# Patient Record
Sex: FEMALE | Race: WHITE | Employment: UNEMPLOYED | URBAN - METROPOLITAN AREA
[De-identification: names, ages, dates, MRNs, and addresses within clinical notes are randomized per-mention and may not be internally consistent; named-entity substitution may affect disease eponyms.]

---

## 2020-07-20 ENCOUNTER — HOSPITAL ENCOUNTER (EMERGENCY)
Age: 50
Discharge: HOME | End: 2020-07-20
Attending: EMERGENCY MEDICINE | Admitting: EMERGENCY MEDICINE
Payer: MEDICARE

## 2020-07-20 ENCOUNTER — APPOINTMENT (OUTPATIENT)
Dept: GENERAL RADIOLOGY | Age: 50
End: 2020-07-20
Attending: EMERGENCY MEDICINE
Payer: MEDICARE

## 2020-07-20 VITALS
BODY MASS INDEX: 41.81 KG/M2 | HEART RATE: 99 BPM | TEMPERATURE: 98.8 F | HEIGHT: 66 IN | SYSTOLIC BLOOD PRESSURE: 145 MMHG | OXYGEN SATURATION: 93 % | WEIGHT: 260.14 LBS | RESPIRATION RATE: 20 BRPM | DIASTOLIC BLOOD PRESSURE: 89 MMHG

## 2020-07-20 DIAGNOSIS — R05.9 COUGH: ICD-10-CM

## 2020-07-20 DIAGNOSIS — R06.02 SOB (SHORTNESS OF BREATH): ICD-10-CM

## 2020-07-20 DIAGNOSIS — Z20.822 SUSPECTED COVID-19 VIRUS INFECTION: Primary | ICD-10-CM

## 2020-07-20 LAB
ALBUMIN SERPL-MCNC: 3.9 G/DL (ref 3.5–5)
ALBUMIN/GLOB SERPL: 1 {RATIO} (ref 1.1–2.2)
ALP SERPL-CCNC: 112 U/L (ref 45–117)
ALT SERPL-CCNC: 24 U/L (ref 12–78)
ANION GAP SERPL CALC-SCNC: 6 MMOL/L (ref 5–15)
AST SERPL-CCNC: 16 U/L (ref 15–37)
BASOPHILS # BLD: 0.1 K/UL (ref 0–0.1)
BASOPHILS NFR BLD: 1 % (ref 0–1)
BILIRUB SERPL-MCNC: 0.7 MG/DL (ref 0.2–1)
BUN SERPL-MCNC: 8 MG/DL (ref 6–20)
BUN/CREAT SERPL: 10 (ref 12–20)
CALCIUM SERPL-MCNC: 9.9 MG/DL (ref 8.5–10.1)
CHLORIDE SERPL-SCNC: 97 MMOL/L (ref 97–108)
CO2 SERPL-SCNC: 31 MMOL/L (ref 21–32)
COMMENT, HOLDF: NORMAL
CREAT SERPL-MCNC: 0.8 MG/DL (ref 0.55–1.02)
DIFFERENTIAL METHOD BLD: ABNORMAL
EOSINOPHIL # BLD: 0.3 K/UL (ref 0–0.4)
EOSINOPHIL NFR BLD: 4 % (ref 0–7)
ERYTHROCYTE [DISTWIDTH] IN BLOOD BY AUTOMATED COUNT: 14.2 % (ref 11.5–14.5)
GLOBULIN SER CALC-MCNC: 4 G/DL (ref 2–4)
GLUCOSE SERPL-MCNC: 104 MG/DL (ref 65–100)
HCT VFR BLD AUTO: 52.5 % (ref 35–47)
HGB BLD-MCNC: 16.7 G/DL (ref 11.5–16)
IMM GRANULOCYTES # BLD AUTO: 0.1 K/UL (ref 0–0.04)
IMM GRANULOCYTES NFR BLD AUTO: 1 % (ref 0–0.5)
LYMPHOCYTES # BLD: 2.5 K/UL (ref 0.8–3.5)
LYMPHOCYTES NFR BLD: 29 % (ref 12–49)
MCH RBC QN AUTO: 29.8 PG (ref 26–34)
MCHC RBC AUTO-ENTMCNC: 31.8 G/DL (ref 30–36.5)
MCV RBC AUTO: 93.6 FL (ref 80–99)
MONOCYTES # BLD: 0.6 K/UL (ref 0–1)
MONOCYTES NFR BLD: 6 % (ref 5–13)
NEUTS SEG # BLD: 5.2 K/UL (ref 1.8–8)
NEUTS SEG NFR BLD: 59 % (ref 32–75)
NRBC # BLD: 0 K/UL (ref 0–0.01)
NRBC BLD-RTO: 0 PER 100 WBC
PLATELET # BLD AUTO: 241 K/UL (ref 150–400)
PMV BLD AUTO: 9.5 FL (ref 8.9–12.9)
POTASSIUM SERPL-SCNC: 3 MMOL/L (ref 3.5–5.1)
PROT SERPL-MCNC: 7.9 G/DL (ref 6.4–8.2)
RBC # BLD AUTO: 5.61 M/UL (ref 3.8–5.2)
SAMPLES BEING HELD,HOLD: NORMAL
SODIUM SERPL-SCNC: 134 MMOL/L (ref 136–145)
WBC # BLD AUTO: 8.8 K/UL (ref 3.6–11)

## 2020-07-20 PROCEDURE — 99283 EMERGENCY DEPT VISIT LOW MDM: CPT

## 2020-07-20 PROCEDURE — 80053 COMPREHEN METABOLIC PANEL: CPT

## 2020-07-20 PROCEDURE — 36415 COLL VENOUS BLD VENIPUNCTURE: CPT

## 2020-07-20 PROCEDURE — 87635 SARS-COV-2 COVID-19 AMP PRB: CPT

## 2020-07-20 PROCEDURE — 85025 COMPLETE CBC W/AUTO DIFF WBC: CPT

## 2020-07-20 PROCEDURE — 71046 X-RAY EXAM CHEST 2 VIEWS: CPT

## 2020-07-20 RX ORDER — ONDANSETRON 4 MG/1
4 TABLET, FILM COATED ORAL
Qty: 15 TAB | Refills: 0 | Status: ON HOLD | OUTPATIENT
Start: 2020-07-20 | End: 2020-12-22

## 2020-07-20 RX ORDER — ALBUTEROL SULFATE 90 UG/1
2 AEROSOL, METERED RESPIRATORY (INHALATION)
Qty: 1 INHALER | Refills: 0 | Status: SHIPPED | OUTPATIENT
Start: 2020-07-20

## 2020-07-20 RX ORDER — ASCORBIC ACID 500 MG
500 TABLET ORAL 2 TIMES DAILY
Qty: 20 TAB | Refills: 0 | Status: ON HOLD | OUTPATIENT
Start: 2020-07-20 | End: 2021-03-16

## 2020-07-20 RX ORDER — UREA 10 %
220 LOTION (ML) TOPICAL DAILY
Qty: 10 TAB | Refills: 0 | Status: ON HOLD | OUTPATIENT
Start: 2020-07-20 | End: 2021-03-16

## 2020-07-20 NOTE — ED PROVIDER NOTES
HPI     Patient presents the emergency department today with complaint of mild shortness of breath,headache, sore throat, cough, nausea and diarrhea. She also states that she has been feeling body aches, joint pain. She would like a COVID test, because her family believes that she has been exposed, and she recently had contact with several members of her family at a reunion. Patient denies difficulty breathing, she denies chest pain, abdominal pain.   She denies dyspnea on exertion  Past Medical History:   Diagnosis Date    Alcoholic ([facility] [address]     Psychiatric disorder     Bipolar       Past Surgical History:   Procedure Laterality Date    HX GYN      D&C         Family History:   Problem Relation Age of Onset    Alcohol abuse Mother     Heart Disease Mother        Social History     Socioeconomic History    Marital status:      Spouse name: Not on file    Number of children: Not on file    Years of education: Not on file    Highest education level: Not on file   Occupational History    Not on file   Social Needs    Financial resource strain: Not on file    Food insecurity     Worry: Not on file     Inability: Not on file    Transportation needs     Medical: Not on file     Non-medical: Not on file   Tobacco Use    Smoking status: Current Every Day Smoker     Packs/day: 1.00    Smokeless tobacco: Never Used   Substance and Sexual Activity    Alcohol use: No    Drug use: No    Sexual activity: Yes     Partners: Male     Birth control/protection: None   Lifestyle    Physical activity     Days per week: Not on file     Minutes per session: Not on file    Stress: Not on file   Relationships    Social connections     Talks on phone: Not on file     Gets together: Not on file     Attends Buddhist service: Not on file     Active member of club or organization: Not on file     Attends meetings of clubs or organizations: Not on file     Relationship status: Not on file    Intimate partner violence     Fear of current or ex partner: Not on file     Emotionally abused: Not on file     Physically abused: Not on file     Forced sexual activity: Not on file   Other Topics Concern    Not on file   Social History Narrative    Not on file         ALLERGIES: Patient has no known allergies. Review of Systems   Constitutional: Positive for appetite change, chills, fatigue and fever. HENT: Positive for sore throat. Negative for congestion, ear discharge, ear pain, rhinorrhea, sinus pressure, sinus pain and trouble swallowing. Eyes: Negative for photophobia, pain, discharge, redness, itching and visual disturbance. Respiratory: Positive for cough and shortness of breath. Negative for chest tightness. Cardiovascular: Negative for chest pain, palpitations and leg swelling. Gastrointestinal: Positive for diarrhea and nausea. Negative for abdominal pain, blood in stool and vomiting. Genitourinary: Negative for frequency, hematuria and urgency. Musculoskeletal: Positive for myalgias. Negative for arthralgias, gait problem, joint swelling, neck pain and neck stiffness. Skin: Negative for color change, pallor and rash. Allergic/Immunologic: Negative for immunocompromised state. Neurological: Positive for weakness and headaches. Negative for seizures, light-headedness and numbness. Hematological: Negative for adenopathy. Does not bruise/bleed easily. Psychiatric/Behavioral: The patient is not nervous/anxious. Vitals:    07/20/20 1432 07/20/20 1654   BP: (!) 159/91 145/89   Pulse: (!) 107 99   Resp: 22 20   Temp: 98.9 °F (37.2 °C) 98.8 °F (37.1 °C)   SpO2: 91% 93%   Weight: 118 kg (260 lb 2.3 oz)    Height: 5' 6\" (1.676 m)             Physical Exam  Vitals signs and nursing note reviewed. Constitutional:       General: She is not in acute distress. Appearance: Normal appearance. She is normal weight. She is not ill-appearing, toxic-appearing or diaphoretic.    HENT:      Head: Normocephalic and atraumatic. Right Ear: External ear normal.      Left Ear: External ear normal.      Nose: Nose normal. No rhinorrhea. Mouth/Throat:      Mouth: Mucous membranes are moist.      Pharynx: No oropharyngeal exudate or posterior oropharyngeal erythema. Eyes:      Extraocular Movements: Extraocular movements intact. Pupils: Pupils are equal, round, and reactive to light. Neck:      Musculoskeletal: Normal range of motion. Cardiovascular:      Rate and Rhythm: Normal rate and regular rhythm. Pulses: Normal pulses. Heart sounds: Normal heart sounds. No murmur. Pulmonary:      Effort: Pulmonary effort is normal. No respiratory distress. Breath sounds: Wheezing and rhonchi present. No rales. Abdominal:      General: Abdomen is flat. There is no distension. Palpations: Abdomen is soft. There is no mass. Tenderness: There is no abdominal tenderness. There is no guarding or rebound. Hernia: No hernia is present. Musculoskeletal: Normal range of motion. Skin:     General: Skin is warm. Capillary Refill: Capillary refill takes 2 to 3 seconds. Neurological:      General: No focal deficit present. Mental Status: She is alert and oriented to person, place, and time. Psychiatric:         Mood and Affect: Mood normal.         Behavior: Behavior normal.          MDM     CO VID1 9 illness. Viral illness, influenza, pneumonia    Patient is a pleasant 59-year-old female presenting today with constellation of symptoms likely consistent with COVID-19 infection. Noting patient's triage vital signs, we reassessed patient's pulse ox, and it was found to be 96% on room air at rest.  I performed exercise test with the patient to ensure no hypoxia, her SPO2 remained 94% or above for a full 3 minutes throughout exercise walking in place. She denied significant dyspnea.   Is a smoker, attempting to quit, she states that she does wheeze on occasion, and has been out of her inhaler for some time. We refilled the patient's prescription inhaler, she has strict return precautions to come back to the hospital if she begins experiencing worsening dyspnea, or has lightheaded feeling or feels weak or extremely fatigued. He is otherwise been provided supportive care, patient has clear x-ray, no evidence of COVID pneumonia.   .me  6:58 PM    Procedures

## 2020-07-20 NOTE — ED TRIAGE NOTES
Triage Note: Patient is coming in with fever, cough and diarrhea for the past week. Patient is coming in to be checked for COVID.

## 2020-07-21 ENCOUNTER — PATIENT OUTREACH (OUTPATIENT)
Dept: CASE MANAGEMENT | Age: 50
End: 2020-07-21

## 2020-07-21 LAB
SARS-COV-2, COV2NT: NOT DETECTED
SOURCE, COVRS: NORMAL
SPECIMEN SOURCE, FCOV2M: NORMAL

## 2020-07-21 NOTE — PROGRESS NOTES
Patient contacted regarding COVID-19  risk. Discussed COVID-19 related testing which was available at this time. Test results were pending. Care Transition Nurse/ Ambulatory Care Manager contacted the patient by telephone to perform post discharge assessment. Verified name and  with patient as identifiers. Provided introduction to self, and explanation of the CTN/ACM role, and reason for call due to risk factors for infection and/or exposure to COVID-19. Symptoms reviewed with patient who verbalized the following symptoms: no worsening symptoms. Due to no new or worsening symptoms encounter was not routed to provider for escalation. Discussed follow-up appointments. If no appointment was previously scheduled, appointment scheduling offered: no patient awaiting result of test.  [facility] follow up appointment(s): No future appointments. Non-[facility] follow up appointment(s): n/a      Advance Care Planning:   Does patient have an Advance Directive: not on file     Patient has following risk factors of: no known risk factors. CTN/ACM reviewed discharge instructions, medical action plan and red flags such as increased shortness of breath, increasing fever and signs of decompensation with patient who verbalized understanding. Discussed exposure protocols and quarantine with CDC Guidelines What to do if you are sick with coronavirus disease 2019.  Patient was given an opportunity for questions and concerns. The parent agrees to contact the [facility] exposure line [phone], [facility] department [facility]  ([phone]) and PCP office for questions related to their healthcare. CTN/ACM provided contact information for future needs. Reviewed and educated patient on any new and changed medications related to discharge diagnosis.     Patient/family/caregiver given information for [employer] and agrees to enroll no      Plan for follow-up call in 5-7 days based on severity of symptoms and risk factors.

## 2020-07-28 ENCOUNTER — PATIENT OUTREACH (OUTPATIENT)
Dept: CASE MANAGEMENT | Age: 50
End: 2020-07-28

## 2020-07-28 NOTE — PROGRESS NOTES
Outgoing call placed regarding follow up day 7 Covid-19. Per EMR patient Covid-19 test result was negative. No answer message left via voice mail no response from patient. Will contact patient in 7 days to continue follow up.

## 2020-08-04 ENCOUNTER — PATIENT OUTREACH (OUTPATIENT)
Dept: CASE MANAGEMENT | Age: 50
End: 2020-08-04

## 2020-08-04 NOTE — PROGRESS NOTES
Patient resolved from Transition of Care episode on 8/4/2020. ACM/CTN was unsuccessful at contacting this patient today. Patient/family was provided the following resources and education related to COVID-19 during the initial call:                         Signs, symptoms and red flags related to COVID-19            CDC exposure and quarantine guidelines            [facility] exposure contact - [phone]            Contact for their local Department of Health                 Patient has not had any additional ED or hospital visits. No further outreach scheduled with this CTN/ACM. Episode of Care resolved. Patient has this CTN/ACM contact information if future needs arise.

## 2020-08-18 ENCOUNTER — OFFICE VISIT (OUTPATIENT)
Dept: NEUROLOGY | Age: 50
End: 2020-08-18
Payer: MEDICARE

## 2020-08-18 VITALS
HEIGHT: 66 IN | HEART RATE: 70 BPM | DIASTOLIC BLOOD PRESSURE: 80 MMHG | SYSTOLIC BLOOD PRESSURE: 138 MMHG | BODY MASS INDEX: 43.04 KG/M2 | WEIGHT: 267.8 LBS | OXYGEN SATURATION: 99 % | TEMPERATURE: 97.1 F | RESPIRATION RATE: 16 BRPM

## 2020-08-18 DIAGNOSIS — R25.1 TREMOR: Primary | ICD-10-CM

## 2020-08-18 PROCEDURE — 99205 OFFICE O/P NEW HI 60 MIN: CPT | Performed by: PSYCHIATRY & NEUROLOGY

## 2020-08-18 RX ORDER — BREXPIPRAZOLE 1 MG/1
TABLET ORAL
Status: ON HOLD | COMMUNITY
Start: 2020-06-17 | End: 2021-03-16

## 2020-08-18 RX ORDER — LURASIDONE HYDROCHLORIDE 60 MG/1
60 TABLET, FILM COATED ORAL
Status: ON HOLD | COMMUNITY
Start: 2020-08-11 | End: 2021-03-16

## 2020-08-18 RX ORDER — ATORVASTATIN CALCIUM 10 MG/1
10 TABLET, FILM COATED ORAL DAILY
COMMUNITY
Start: 2020-07-21

## 2020-08-18 RX ORDER — GABAPENTIN 600 MG/1
600 TABLET ORAL 3 TIMES DAILY
Status: ON HOLD | COMMUNITY
Start: 2020-07-21 | End: 2020-12-22

## 2020-08-18 RX ORDER — PROPRANOLOL HYDROCHLORIDE 40 MG/1
40 TABLET ORAL
COMMUNITY
Start: 2020-08-07 | End: 2020-08-18 | Stop reason: DRUGHIGH

## 2020-08-18 RX ORDER — DEXTROAMPHETAMINE SACCHARATE, AMPHETAMINE ASPARTATE, DEXTROAMPHETAMINE SULFATE AND AMPHETAMINE SULFATE 5; 5; 5; 5 MG/1; MG/1; MG/1; MG/1
20 TABLET ORAL 2 TIMES DAILY
Status: ON HOLD | COMMUNITY
Start: 2020-08-04 | End: 2021-03-16

## 2020-08-18 RX ORDER — ESZOPICLONE 3 MG/1
3 TABLET, FILM COATED ORAL
Status: ON HOLD | COMMUNITY
Start: 2020-07-21 | End: 2021-03-16

## 2020-08-18 RX ORDER — PROPRANOLOL HYDROCHLORIDE 160 MG/1
160 CAPSULE, EXTENDED RELEASE ORAL DAILY
Qty: 30 CAP | Refills: 3 | Status: ON HOLD | OUTPATIENT
Start: 2020-08-18 | End: 2020-12-22 | Stop reason: DRUGHIGH

## 2020-08-18 RX ORDER — PRIMIDONE 50 MG/1
50 TABLET ORAL
Qty: 30 TAB | Refills: 5 | Status: SHIPPED | OUTPATIENT
Start: 2020-08-18 | End: 2021-03-19

## 2020-08-18 RX ORDER — HYDROCHLOROTHIAZIDE 25 MG/1
25 TABLET ORAL DAILY
COMMUNITY
Start: 2020-07-21 | End: 2021-03-19

## 2020-08-18 NOTE — PROGRESS NOTES
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 Also c/o feels like her legs are going to buckle. 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 HPI:     For the past 10 yrs, patient noted bilateral hand tremor, postural, L worse than R. Patient was placed on Propanolol 40 mg, but was increased to 80 mg BID due to worsening tremor.     (+) weakness of legs described as buckling. Patient was seeing a psychiatrist - On Lithium for 20 yrs and Seroquel for several yrs. ROS   (-) fever  (-) rash  All other systems reviewed and are negative    Past Medical Hx  Past Medical History:   Diagnosis Date    Alcoholic ([facility] [address]     Psychiatric disorder     Bipolar   Pre diabetic    Social Hx  Social History     Socioeconomic History    Marital status:      Spouse name: Not on file    Number of children: Not on file    Years of education: Not on file    Highest education level: Not on file   Tobacco Use    Smoking status: Current Every Day Smoker     Packs/day: 1.00    Smokeless tobacco: Never Used   Substance and Sexual Activity    Alcohol use: No    Drug use: No    Sexual activity: Yes     Partners: Male     Birth control/protection: None   Recovering alcoholic - 2174    Family Hx  Family History   Problem Relation Age of Onset    Alcohol abuse Mother     Heart Disease Mother    Was a - triplet - SIster and brother - paternal grandfather - tremor    ALLERGIES  No Known Allergies    CURRENT MEDS  Current Outpatient Medications   Medication Sig Dispense Refill    atorvastatin (LIPITOR) 10 mg tablet Take 10 mg by mouth daily.       Rexulti 1 mg tab tablet TAKE 1 TABLET BY MOUTH AT BEDTIME      eszopiclone (LUNESTA) 3 mg tablet Take 3 mg by mouth nightly as needed.  gabapentin (NEURONTIN) 600 mg tablet Take 600 mg by mouth three (3) times daily.  hydroCHLOROthiazide (HYDRODIURIL) 25 mg tablet       Latuda 40 mg tab tablet Take 40 mg by mouth.  dextroamphetamine-amphetamine (ADDERALL) 20 mg tablet Take 20 mg by mouth two (2) times a day.  propranolol LA (INDERAL LA) 160 mg capsule Take 1 Cap by mouth daily. 30 Cap 3    primidone (MYSOLINE) 50 mg tablet Take 1 Tab by mouth nightly. Indications: essential tremor 30 Tab 5    ascorbic acid, vitamin C, (Vitamin C) 500 mg tablet Take 1 Tab by mouth two (2) times a day. 20 Tab 0    zinc sulfate 220 mg tablet Take 1 Tab by mouth daily. 10 Tab 0    lithium 300 mg tablet Take 300 mg by mouth three (3) times daily. Indications: BIPOLAR DISORDER      buPROPion SR (WELLBUTRIN SR) 100 mg SR tablet Take 200 mg by mouth daily. Indications: DEPRESSION      QUEtiapine (SEROQUEL) 400 mg tablet Take 800 mg by mouth nightly. Indications: DEPRESSION TREATMENT ADJUNCT      albuterol (PROVENTIL HFA, VENTOLIN HFA, PROAIR HFA) 90 mcg/actuation inhaler Take 2 Puffs by inhalation every four (4) hours as needed for Wheezing. 1 Inhaler 0    ondansetron hcl (ZOFRAN) 4 mg tablet Take 1 Tab by mouth every eight (8) hours as needed for Nausea or Vomiting. 15 Tab 0    asenapine (SAPHRIS, BLACK CHERRY,) 10 mg subl 10 mg by SubLINGual route nightly.  amphetamine-dextroamphetamine (ADDERALL) 30 mg tablet Take 30 mg by mouth daily.  escitalopram (LEXAPRO) 20 mg tablet Take 40 mg by mouth daily. Indications: DEPRESSION ASSOCIATED WITH MANIC DEPRESSIVE DISORDER      topiramate (TOPAMAX) 100 mg tablet Take 100 mg by mouth nightly.  Indications: ALCOHOLISM             PREVIOUS WORKUP: (reviewed)  IMAGING:    CT Results (recent):  Results from Hospital Encounter encounter on 11/09/14   CT HEAD WO CONT    Narrative **Final Report**       ICD Codes / Adm. Diagnosis: 362120  [id number] / Arm Pain  Neck Pain  Examination:  CT HEAD WO CON  - [id number] - Nov 9 2014  7:22PM  Accession No:  [id number]  Reason:  Pain      REPORT:  INDICATION: Pain     EXAM: CT Head without contrast.    FINDINGS: Unenhanced CT Head is performed. The brain parenchyma is   unremarkable in appearance for age, without evidence for infarct. There is   no bleed, mass, shift, hydrocephalus or extra-axial fluid collection. Bone   windows are unremarkable. IMPRESSION: No Intracranial Disease Evident on Head CT. Signing/Reading Doctor: Julien Morillo ([id number])    Approved: Julien Morillo ([id number])  Nov 9 2014  7:31PM                                   MRI Results (recent):  Results from Hospital Encounter encounter on 06/05/12   MRI BRAIN W WO CONT    Narrative **Final Report**       ICD Codes / Adm. Diagnosis: 780.2   / Syncope and collapse    Examination:  MR BRAIN W AND WO CON  - [id number] - Jun 6 2012 12:05PM  Accession No:  [id number]  Reason:        REPORT:  INDICATION: Left arm weakness and dizziness    COMPARISON: CT 05/31/2012    EXAM: Sagittal T1-weighted spin-echo, axial FLAIR and T2-weighted fast   spin-echo, axial diffusion weighted echo planar, axial T1-weighted   spin-echo, axial gradient echo, and post IV contrast-enhanced axial and   coronal T1-weighted spin-echo MR images of the brain are obtained. A total   of 20 cc intravenous Magnevist was administered for the study. FINDINGS: Intra-axial morphology, signal and enhancement are normal. The   vascular flow-voids at the base of the brain are normal in conspicuity. There is no extra-axial collection, mass or enhancement abnormality   demonstrated. Sella, optic chiasm, posterior fossa, orbits and paranasal   sinuses are normal. There is generalized increased signal in the right   mastoid air cells. IMPRESSION: Increased signal in right mastoid air cells.  Otherwise, normal   brain MRI. No evidence for acute infarction. Signing/Reading Doctor: Eliseo Cyr. Fidencio Montilla ([id number])    Approved: JAMEEL Montilla ([id number])  06/06/2012                                      IR Results (recent):  No results found for this or any previous visit. VAS/US Results (recent):  No results found for this or any previous visit. LABS (reviewed)  Results for orders placed or performed during the hospital encounter of 07/20/20   CBC WITH AUTOMATED DIFF   Result Value Ref Range    WBC 8.8 3.6 - 11.0 K/uL    RBC 5.61 (H) 3.80 - 5.20 M/uL    HGB 16.7 (H) 11.5 - 16.0 g/dL    HCT 52.5 (H) 35.0 - 47.0 %    MCV 93.6 80.0 - 99.0 FL    MCH 29.8 26.0 - 34.0 PG    MCHC 31.8 30.0 - 36.5 g/dL    RDW 14.2 11.5 - 14.5 %    PLATELET 776 327 - 904 K/uL    MPV 9.5 8.9 - 12.9 FL    NRBC 0.0 0  WBC    ABSOLUTE NRBC 0.00 0.00 - 0.01 K/uL    NEUTROPHILS 59 32 - 75 %    LYMPHOCYTES 29 12 - 49 %    MONOCYTES 6 5 - 13 %    EOSINOPHILS 4 0 - 7 %    BASOPHILS 1 0 - 1 %    IMMATURE GRANULOCYTES 1 (H) 0.0 - 0.5 %    ABS. NEUTROPHILS 5.2 1.8 - 8.0 K/UL    ABS. LYMPHOCYTES 2.5 0.8 - 3.5 K/UL    ABS. MONOCYTES 0.6 0.0 - 1.0 K/UL    ABS. EOSINOPHILS 0.3 0.0 - 0.4 K/UL    ABS. BASOPHILS 0.1 0.0 - 0.1 K/UL    ABS. IMM. GRANS. 0.1 (H) 0.00 - 0.04 K/UL    DF AUTOMATED     METABOLIC PANEL, COMPREHENSIVE   Result Value Ref Range    Sodium 134 (L) 136 - 145 mmol/L    Potassium 3.0 (L) 3.5 - 5.1 mmol/L    Chloride 97 97 - 108 mmol/L    CO2 31 21 - 32 mmol/L    Anion gap 6 5 - 15 mmol/L    Glucose 104 (H) 65 - 100 mg/dL    BUN 8 6 - 20 MG/DL    Creatinine 0.80 0.55 - 1.02 MG/DL    BUN/Creatinine ratio 10 (L) 12 - 20      GFR est AA >60 >60 ml/min/1.73m2    GFR est non-AA >60 >60 ml/min/1.73m2    Calcium 9.9 8.5 - 10.1 MG/DL    Bilirubin, total 0.7 0.2 - 1.0 MG/DL    ALT (SGPT) 24 12 - 78 U/L    AST (SGOT) 16 15 - 37 U/L    Alk.  phosphatase 112 45 - 117 U/L    Protein, total 7.9 6.4 - 8.2 g/dL    Albumin 3.9 3.5 - 5.0 g/dL    Globulin 4.0 2.0 - 4.0 g/dL    A-G Ratio 1.0 (L) 1.1 - 2.2     SAMPLES BEING HELD   Result Value Ref Range    SAMPLES BEING HELD 1BLU,1RED     COMMENT        Add-on orders for these samples will be processed based on acceptable specimen integrity and analyte stability, which may vary by analyte. SARS-COV-2   Result Value Ref Range    Specimen source Nasopharyngeal      SARS-CoV-2 Not Detected Not Detected      Specimen source Nasopharyngeal         Physical Exam:     Visit Vitals  /80 (BP 1 Location: Right arm, BP Patient Position: Sitting)   Pulse 70   Temp 97.1 °F (36.2 °C)   Resp 16   Ht 5' 6\" (1.676 m)   Wt 121.5 kg (267 lb 12.8 oz)   SpO2 99%   BMI 43.22 kg/m²     General:  Alert, cooperative, no distress. Head:  Normocephalic, without obvious abnormality, atraumatic. Eyes:  Conjunctivae/corneas clear. Lungs:  Heart:   Non labored breathing  Regular rate and rhythm, no carotid bruits   Abdomen:   Soft, non-distended   Extremities: Extremities normal, atraumatic, no cyanosis or edema. Pulses: 2+ and symmetric all extremities. Skin: Skin color, texture, turgor normal. No rashes or lesions.   Neurologic Exam     Gen: Attention normal             Language: naming, repetition, fluency normal             Memory: intact recent and remote memory  Cranial Nerves:  I: smell Not tested   II: visual fields Full to confrontation   II: pupils Equal, round, reactive to light   II: optic disc No papilledema   III,VII: ptosis none   III,IV,VI: extraocular muscles  Full ROM   V: mastication normal   V: facial light touch sensation  normal   VII: facial muscle function   symmetric   VIII: hearing symmetric   IX: soft palate elevation  normal   XI: trapezius strength  5/5   XI: sternocleidomastoid strength 5/5   XI: neck flexion strength  5/5   XII: tongue  midline     Motor: normal bulk and tone, no tremor              Strength: 5/5 all four extremities  (+) bilateral postural hand tremor, L worse than R  (+) slight rigidity in both UE  (-) bradykinesia  (-) masked facies  Sensory: intact to LT, PP, vibration, and JPS  Reflexes: 1+ throughout; absent knees and anklesDown going toes  Coordination: Good FTN and HTS  Gait: 1 step pivot, some problem with tandem walkin           Impression:     Millie Saavedra is a 48 y.o. female who  has a past medical history of Alcoholic ([address] and Psychiatric disorder. Bipolar who for the past 10 yrs,noted bilateral hand tremor, postural, L worse than R. Considerations include medication side effect (Patient was seeing a psychiatrist - On Lithium for 20 yrs and Seroquel for several yrs) and familial tremor (sister and grandfather also had tremor). Patient was placed on Propanolol 40 mg, but was increased to 80 mg BID due to worsening tremor. RECOMMENDATIONS  1. I had a long discussion with patient. Discussed diagnosis, prognosis, pathophysiology and available treatment. Reviewed test results. All questions were answered. 2. Will start on Primidone 50 mg QHS for tremor and may help her sleep and get off Seroquel hopefully faster  3. Switch to Inderal  mg every day to improve compliance. Monitor BP and heart rate  4. Follow up with with psychiatrist regarding getting her off Lithium and Seroquel to see if tremors will improve  5. Advise to avoid caffeine  6. Advise use of weighted pen/utensils      Follow-up and Dispositions    · Return in about 3 months (around 11/18/2020).             Thank you for the consultation      Lauren Martel MD  Diplomate, American Board of Psychiatry and Neurology  Diplomate, Neuromuscular Medicine  Diplomate, American Board of Electrodiagnostic Medicine        CC: Carmel Winters MD  Fax: [phone]

## 2020-08-18 NOTE — LETTER
8/18/20 Patient: Dharmesh Aguiar YOB: 1970 Date of Visit: 8/18/2020 Carlos Enrique Stephens MD 
[place] [address] [address] [phone] VIA In Basket Dear Carlos Enrique Stephens MD, Thank you for referring Ms. Franco Webster to [facility] for evaluation. My notes for this consultation are attached. If you have questions, please do not hesitate to call me. I look forward to following your patient along with you. Sincerely, Magno Santillan MD

## 2020-08-18 NOTE — PROGRESS NOTES
Chief Complaint   Patient presents with    New Patient     New patient ref for familiar tremors. Also c/o feels like her legs are going to buckle. 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      Visit Vitals  /80 (BP 1 Location: Right arm, BP Patient Position: Sitting)   Pulse 70   Temp 97.1 °F (36.2 °C)   Resp 16   Ht 5' 6\" (1.676 m)   Wt 121.5 kg (267 lb 12.8 oz)   SpO2 99%   BMI 43.22 kg/m²

## 2020-12-21 ENCOUNTER — HOSPITAL ENCOUNTER (INPATIENT)
Age: 50
LOS: 2 days | Discharge: HOME | DRG: 918 | End: 2020-12-23
Attending: EMERGENCY MEDICINE | Admitting: FAMILY MEDICINE
Payer: MEDICARE

## 2020-12-21 ENCOUNTER — APPOINTMENT (OUTPATIENT)
Dept: CT IMAGING | Age: 50
DRG: 918 | End: 2020-12-21
Attending: EMERGENCY MEDICINE
Payer: MEDICARE

## 2020-12-21 ENCOUNTER — APPOINTMENT (OUTPATIENT)
Dept: GENERAL RADIOLOGY | Age: 50
DRG: 918 | End: 2020-12-21
Attending: EMERGENCY MEDICINE
Payer: MEDICARE

## 2020-12-21 DIAGNOSIS — T56.891A LITHIUM TOXICITY, ACCIDENTAL OR UNINTENTIONAL, INITIAL ENCOUNTER: Primary | ICD-10-CM

## 2020-12-21 LAB
ALBUMIN SERPL-MCNC: 4.2 G/DL (ref 3.5–5)
ALBUMIN/GLOB SERPL: 1.1 {RATIO} (ref 1.1–2.2)
ALP SERPL-CCNC: 142 U/L (ref 45–117)
ALT SERPL-CCNC: 38 U/L (ref 12–78)
ANION GAP SERPL CALC-SCNC: 7 MMOL/L (ref 5–15)
APPEARANCE UR: ABNORMAL
AST SERPL-CCNC: 29 U/L (ref 15–37)
BACTERIA URNS QL MICRO: NEGATIVE /HPF
BASOPHILS # BLD: 0.2 K/UL (ref 0–0.1)
BASOPHILS NFR BLD: 1 % (ref 0–1)
BILIRUB SERPL-MCNC: 0.4 MG/DL (ref 0.2–1)
BILIRUB UR QL: NEGATIVE
BUN SERPL-MCNC: 14 MG/DL (ref 6–20)
BUN/CREAT SERPL: 14 (ref 12–20)
CALCIUM SERPL-MCNC: 11.2 MG/DL (ref 8.5–10.1)
CHLORIDE SERPL-SCNC: 95 MMOL/L (ref 97–108)
CO2 SERPL-SCNC: 32 MMOL/L (ref 21–32)
COLOR UR: ABNORMAL
COMMENT, HOLDF: NORMAL
CREAT SERPL-MCNC: 0.97 MG/DL (ref 0.55–1.02)
DATE LAST DOSE: ABNORMAL
DATE LAST DOSE: ABNORMAL
DIFFERENTIAL METHOD BLD: ABNORMAL
EOSINOPHIL # BLD: 0.6 K/UL (ref 0–0.4)
EOSINOPHIL NFR BLD: 4 % (ref 0–7)
EPITH CASTS URNS QL MICRO: ABNORMAL /LPF
ERYTHROCYTE [DISTWIDTH] IN BLOOD BY AUTOMATED COUNT: 12.5 % (ref 11.5–14.5)
GLOBULIN SER CALC-MCNC: 3.7 G/DL (ref 2–4)
GLUCOSE SERPL-MCNC: 116 MG/DL (ref 65–100)
GLUCOSE UR STRIP.AUTO-MCNC: NEGATIVE MG/DL
HCT VFR BLD AUTO: 48.6 % (ref 35–47)
HGB BLD-MCNC: 16.1 G/DL (ref 11.5–16)
HGB UR QL STRIP: ABNORMAL
IMM GRANULOCYTES # BLD AUTO: 0.2 K/UL (ref 0–0.04)
IMM GRANULOCYTES NFR BLD AUTO: 1 % (ref 0–0.5)
KETONES UR QL STRIP.AUTO: NEGATIVE MG/DL
LEUKOCYTE ESTERASE UR QL STRIP.AUTO: NEGATIVE
LIPASE SERPL-CCNC: 459 U/L (ref 73–393)
LITHIUM SERPL-SCNC: 2.03 MMOL/L (ref 0.6–1.2)
LITHIUM SERPL-SCNC: 2.5 MMOL/L (ref 0.6–1.2)
LYMPHOCYTES # BLD: 3.2 K/UL (ref 0.8–3.5)
LYMPHOCYTES NFR BLD: 21 % (ref 12–49)
MCH RBC QN AUTO: 31 PG (ref 26–34)
MCHC RBC AUTO-ENTMCNC: 33.1 G/DL (ref 30–36.5)
MCV RBC AUTO: 93.6 FL (ref 80–99)
MONOCYTES # BLD: 0.9 K/UL (ref 0–1)
MONOCYTES NFR BLD: 6 % (ref 5–13)
NEUTS SEG # BLD: 10.3 K/UL (ref 1.8–8)
NEUTS SEG NFR BLD: 67 % (ref 32–75)
NITRITE UR QL STRIP.AUTO: NEGATIVE
NRBC # BLD: 0 K/UL (ref 0–0.01)
NRBC BLD-RTO: 0 PER 100 WBC
PH UR STRIP: 7 [PH] (ref 5–8)
PLATELET # BLD AUTO: 277 K/UL (ref 150–400)
PMV BLD AUTO: 9.1 FL (ref 8.9–12.9)
POTASSIUM SERPL-SCNC: 3.4 MMOL/L (ref 3.5–5.1)
PROT SERPL-MCNC: 7.9 G/DL (ref 6.4–8.2)
PROT UR STRIP-MCNC: NEGATIVE MG/DL
RBC # BLD AUTO: 5.19 M/UL (ref 3.8–5.2)
RBC #/AREA URNS HPF: >100 /HPF (ref 0–5)
RBC MORPH BLD: ABNORMAL
REPORTED DOSE,DOSE: ABNORMAL UNITS
REPORTED DOSE,DOSE: ABNORMAL UNITS
REPORTED DOSE/TIME,TMG: ABNORMAL
REPORTED DOSE/TIME,TMG: ABNORMAL
SAMPLES BEING HELD,HOLD: NORMAL
SODIUM SERPL-SCNC: 134 MMOL/L (ref 136–145)
SP GR UR REFRACTOMETRY: >1.03 (ref 1–1.03)
TROPONIN I SERPL-MCNC: <0.05 NG/ML
TSH SERPL DL<=0.05 MIU/L-ACNC: 8.04 UIU/ML (ref 0.36–3.74)
UR CULT HOLD, URHOLD: NORMAL
UROBILINOGEN UR QL STRIP.AUTO: 0.2 EU/DL (ref 0.2–1)
WBC # BLD AUTO: 15.4 K/UL (ref 3.6–11)
WBC URNS QL MICRO: ABNORMAL /HPF (ref 0–4)

## 2020-12-21 PROCEDURE — 84443 ASSAY THYROID STIM HORMONE: CPT

## 2020-12-21 PROCEDURE — 74011000636 HC RX REV CODE- 636: Performed by: FAMILY MEDICINE

## 2020-12-21 PROCEDURE — 80178 ASSAY OF LITHIUM: CPT

## 2020-12-21 PROCEDURE — 65660000000 HC RM CCU STEPDOWN

## 2020-12-21 PROCEDURE — 74018 RADEX ABDOMEN 1 VIEW: CPT

## 2020-12-21 PROCEDURE — 71045 X-RAY EXAM CHEST 1 VIEW: CPT

## 2020-12-21 PROCEDURE — 81001 URINALYSIS AUTO W/SCOPE: CPT

## 2020-12-21 PROCEDURE — 74011250636 HC RX REV CODE- 250/636: Performed by: EMERGENCY MEDICINE

## 2020-12-21 PROCEDURE — 83690 ASSAY OF LIPASE: CPT

## 2020-12-21 PROCEDURE — 93005 ELECTROCARDIOGRAM TRACING: CPT

## 2020-12-21 PROCEDURE — 85025 COMPLETE CBC W/AUTO DIFF WBC: CPT

## 2020-12-21 PROCEDURE — 74177 CT ABD & PELVIS W/CONTRAST: CPT

## 2020-12-21 PROCEDURE — 36415 COLL VENOUS BLD VENIPUNCTURE: CPT

## 2020-12-21 PROCEDURE — 96374 THER/PROPH/DIAG INJ IV PUSH: CPT

## 2020-12-21 PROCEDURE — 84484 ASSAY OF TROPONIN QUANT: CPT

## 2020-12-21 PROCEDURE — 74011000258 HC RX REV CODE- 258: Performed by: FAMILY MEDICINE

## 2020-12-21 PROCEDURE — 99284 EMERGENCY DEPT VISIT MOD MDM: CPT

## 2020-12-21 PROCEDURE — 80053 COMPREHEN METABOLIC PANEL: CPT

## 2020-12-21 RX ORDER — SODIUM CHLORIDE 9 MG/ML
125 INJECTION, SOLUTION INTRAVENOUS CONTINUOUS
Status: DISCONTINUED | OUTPATIENT
Start: 2020-12-21 | End: 2020-12-23 | Stop reason: HOSPADM

## 2020-12-21 RX ORDER — ACETAMINOPHEN 500 MG
TABLET ORAL
Status: ON HOLD | COMMUNITY
Start: 2020-12-11 | End: 2020-12-22

## 2020-12-21 RX ORDER — SODIUM CHLORIDE 9 MG/ML
125 INJECTION, SOLUTION INTRAVENOUS CONTINUOUS
Status: DISCONTINUED | OUTPATIENT
Start: 2020-12-21 | End: 2020-12-21

## 2020-12-21 RX ORDER — ONDANSETRON 2 MG/ML
4 INJECTION INTRAMUSCULAR; INTRAVENOUS
Status: DISCONTINUED | OUTPATIENT
Start: 2020-12-21 | End: 2020-12-23 | Stop reason: HOSPADM

## 2020-12-21 RX ORDER — SODIUM CHLORIDE 9 MG/ML
10 INJECTION INTRAMUSCULAR; INTRAVENOUS; SUBCUTANEOUS ONCE
Status: COMPLETED | OUTPATIENT
Start: 2020-12-21 | End: 2020-12-21

## 2020-12-21 RX ORDER — OMEPRAZOLE 40 MG/1
CAPSULE, DELAYED RELEASE ORAL
COMMUNITY
Start: 2020-10-23

## 2020-12-21 RX ORDER — IPRATROPIUM BROMIDE AND ALBUTEROL SULFATE 2.5; .5 MG/3ML; MG/3ML
3 SOLUTION RESPIRATORY (INHALATION)
Status: DISCONTINUED | OUTPATIENT
Start: 2020-12-21 | End: 2020-12-23 | Stop reason: HOSPADM

## 2020-12-21 RX ORDER — SODIUM CHLORIDE 9 MG/ML
50 INJECTION, SOLUTION INTRAVENOUS
Status: COMPLETED | OUTPATIENT
Start: 2020-12-21 | End: 2020-12-21

## 2020-12-21 RX ORDER — ONDANSETRON 2 MG/ML
4 INJECTION INTRAMUSCULAR; INTRAVENOUS
Status: COMPLETED | OUTPATIENT
Start: 2020-12-21 | End: 2020-12-21

## 2020-12-21 RX ORDER — CYANOCOBALAMIN 1000 UG/ML
1000 INJECTION, SOLUTION INTRAMUSCULAR; SUBCUTANEOUS
COMMUNITY
Start: 2020-12-11

## 2020-12-21 RX ADMIN — SODIUM CHLORIDE 50 ML: 900 INJECTION, SOLUTION INTRAVENOUS at 17:31

## 2020-12-21 RX ADMIN — IOPAMIDOL 100 ML: 755 INJECTION, SOLUTION INTRAVENOUS at 17:30

## 2020-12-21 RX ADMIN — ONDANSETRON 4 MG: 2 INJECTION INTRAMUSCULAR; INTRAVENOUS at 16:39

## 2020-12-21 RX ADMIN — SODIUM CHLORIDE 10 ML: 9 INJECTION INTRAMUSCULAR; INTRAVENOUS; SUBCUTANEOUS at 17:31

## 2020-12-21 RX ADMIN — SODIUM CHLORIDE 1000 ML: 900 INJECTION, SOLUTION INTRAVENOUS at 16:41

## 2020-12-21 RX ADMIN — SODIUM CHLORIDE 100 ML/HR: 9 INJECTION, SOLUTION INTRAVENOUS at 18:06

## 2020-12-21 NOTE — ED NOTES
Bedside shift change report given to Era Baby  RN (oncoming nurse) by Birdie Gonzalez RN (offgoing nurse). Report included the following information SBAR, Kardex, ED Summary, Recent Results and Cardiac Rhythm NSR.

## 2020-12-21 NOTE — ED PROVIDER NOTES
The history is provided by the patient. No  was used. Diarrhea   This is a new problem. The current episode started more than 1 week ago. The problem occurs daily. The problem has not changed since onset. The pain is associated with vomiting. The patient is experiencing no pain. Associated symptoms include diarrhea, nausea and vomiting. Pertinent negatives include no anorexia, no fever, no belching, no flatus, no hematochezia, no melena, no constipation, no dysuria, no frequency, no hematuria, no headaches, no arthralgias, no myalgias, no trauma, no chest pain and no back pain. Nothing worsens the pain. The pain is relieved by nothing. Past workup includes no esophagogastroduodenoscopy, no colonoscopy. The patient's surgical history non-contributory.        Past Medical History:   Diagnosis Date    Alcoholic ([address]     High cholesterol     Hypertension     Psychiatric disorder     Bipolar       Past Surgical History:   Procedure Laterality Date    HX GYN      D&C         Family History:   Problem Relation Age of Onset    Alcohol abuse Mother     Heart Disease Mother        Social History     Socioeconomic History    Marital status:      Spouse name: Not on file    Number of children: Not on file    Years of education: Not on file    Highest education level: Not on file   Occupational History    Not on file   Social Needs    Financial resource strain: Not on file    Food insecurity     Worry: Not on file     Inability: Not on file    Transportation needs     Medical: Not on file     Non-medical: Not on file   Tobacco Use    Smoking status: Current Every Day Smoker     Packs/day: 0.50    Smokeless tobacco: Never Used   Substance and Sexual Activity    Alcohol use: No    Drug use: No    Sexual activity: Yes     Partners: Male     Birth control/protection: None   Lifestyle    Physical activity     Days per week: Not on file     Minutes per session: Not on file    Stress: Not on file   Relationships    Social connections     Talks on phone: Not on file     Gets together: Not on file     Attends Religion service: Not on file     Active member of club or organization: Not on file     Attends meetings of clubs or organizations: Not on file     Relationship status: Not on file    Intimate partner violence     Fear of current or ex partner: Not on file     Emotionally abused: Not on file     Physically abused: Not on file     Forced sexual activity: Not on file   Other Topics Concern    Not on file   Social History Narrative    Not on file         ALLERGIES: Patient has no known allergies. Review of Systems   Constitutional: Negative for activity change, chills and fever. HENT: Negative for nosebleeds, sore throat, trouble swallowing and voice change. Eyes: Negative for visual disturbance. Respiratory: Negative for shortness of breath. Cardiovascular: Negative for chest pain and palpitations. Gastrointestinal: Positive for diarrhea, nausea and vomiting. Negative for abdominal pain, anorexia, constipation, flatus, hematochezia and melena. Genitourinary: Negative for difficulty urinating, dysuria, frequency, hematuria and urgency. Musculoskeletal: Negative for arthralgias, back pain, myalgias, neck pain and neck stiffness. Skin: Negative for color change. Allergic/Immunologic: Negative for immunocompromised state. Neurological: Positive for tremors. Negative for dizziness, seizures, syncope, weakness, light-headedness, numbness and headaches. Psychiatric/Behavioral: Negative for behavioral problems, confusion, hallucinations, self-injury and suicidal ideas. Vitals:    12/21/20 1539   BP: (!) 150/88   Pulse: 62   Resp: 24   Temp: 98.2 °F (36.8 °C)   SpO2: 95%   Weight: 116.3 kg (256 lb 6.3 oz)   Height: 5' 6\" (1.676 m)            Physical Exam  Vitals signs and nursing note reviewed. Constitutional:       General: She is not in acute distress. Appearance: She is well-developed. She is not diaphoretic. HENT:      Head: Normocephalic and atraumatic. Eyes:      Pupils: Pupils are equal, round, and reactive to light. Neck:      Musculoskeletal: Normal range of motion and neck supple. Cardiovascular:      Rate and Rhythm: Normal rate and regular rhythm. Heart sounds: Normal heart sounds. No murmur. No friction rub. No gallop. Pulmonary:      Effort: Pulmonary effort is normal. No respiratory distress. Breath sounds: Normal breath sounds. No wheezing. Abdominal:      General: Bowel sounds are normal. There is no distension. Palpations: Abdomen is soft. Tenderness: There is no abdominal tenderness. There is no guarding or rebound. Musculoskeletal: Normal range of motion. Skin:     General: Skin is warm. Findings: No rash. Neurological:      Mental Status: She is alert and oriented to person, place, and time. Motor: Tremor present. Psychiatric:         Behavior: Behavior normal.         Thought Content: Thought content normal.         Judgment: Judgment normal.          MDM  Number of Diagnoses or Management Options  Lithium toxicity, accidental or unintentional, initial encounter  Diagnosis management comments: Total critical care time spent exclusive of procedures:  54min       This is a 27-year-old female with past medical history, review of systems, physical exam as above, presenting with complaints of 3 weeks of loose bowel movements, nausea and vomiting. Patient states 3-4 loose bowel movements per day, constant nausea with vomiting of stomach contents every several days. She denies abdominal pain, fevers or chills, chest pain or shortness of breath. She states she called her primary care physician was referred to the emergency department for presumed dehydration. She states she is able to tolerate water without difficulty.   Physical exam is remarkable for an obese well-appearing middle-aged female, in no acute distress, noted to be mildly hypertensive, afebrile, without tachycardia, satting 99% on room air. She has scattered wheezes to auscultation, soft nontender abdomen, regular rate and rhythm without murmurs gallops or rubs. Patient states she continues to smoke half a pack a day. She states she is scheduled to have a colonoscopy due to age. Differential includes dehydration, electrolyte abnormality, elevated lithium level, gastroenteritis. Plan to provide antiemetics, obtain CMP, CBC, lipase, UA, KUB, lithium level. We will reassess, and make a disposition. Procedures    Perfect Serve Consult for Admission  4:54 PM    ED Room Number: XUH29/04  Patient Name and age:  Natalie Carmona 48 y.o.  female  Working Diagnosis:   1. Lithium toxicity, accidental or unintentional, initial encounter        COVID-19 Suspicion:  no  Sepsis present:  no  Reassessment needed: yes  Code Status:  Full Code  Readmission: no  Isolation Requirements:  no  Recommended Level of Care:  telemetry  Department:[place] [facility] - [phone]  Other:  D/w Dr. Damián Guardado    5:01 PM  Toxic lithium level on lab work, accounting for GI symptoms and refractory tremor. Patient discussed with [facility] who recommended admission, hydration, monitoring for levels and acute renal failure. Hospitalist consulted for admission. 5:10 PM  EKG reads for STEMI, no reports of chest pain, likely 2/2 effects of elevated Lithium, will defer STEMI alert, consult Cardiology. 5:25 PM  Patient d/w Dr. Dmitriy Paniagua (Cardiology) who states EKG unchanged from 2014,  recommends deferring STEMI alert, check trop, admit as planned.

## 2020-12-21 NOTE — ED NOTES
Spoke with poison control. Patient is to remain on telemetry. Patient is to be on strict I/Os. Obtain a urine output of 1-2cc/kg/hr. Check lithium levels every 6 hours until there is a peak and 2 trending down levels. Hydration is the main treatment.

## 2020-12-21 NOTE — ED TRIAGE NOTES
Pt reports intermittent n/v/d x3 weeks. Pt reports generalized weakness and her legs have been giving out for the past month progressively. Pt has non productive cough and appears to have heavier work of breathing upon arrival to ED room. Pt reports \"she always coughs from her smoking and she feels like her breathing is normal for her. \"

## 2020-12-22 LAB
ANION GAP SERPL CALC-SCNC: 4 MMOL/L (ref 5–15)
ATRIAL RATE: 60 BPM
ATRIAL RATE: 61 BPM
BASOPHILS # BLD: 0.1 K/UL (ref 0–0.1)
BASOPHILS NFR BLD: 1 % (ref 0–1)
BUN SERPL-MCNC: 13 MG/DL (ref 6–20)
BUN/CREAT SERPL: 16 (ref 12–20)
CALCIUM SERPL-MCNC: 9.9 MG/DL (ref 8.5–10.1)
CALCULATED P AXIS, ECG09: 35 DEGREES
CALCULATED P AXIS, ECG09: 86 DEGREES
CALCULATED R AXIS, ECG10: 68 DEGREES
CALCULATED R AXIS, ECG10: 83 DEGREES
CALCULATED T AXIS, ECG11: 102 DEGREES
CALCULATED T AXIS, ECG11: 126 DEGREES
CHLORIDE SERPL-SCNC: 100 MMOL/L (ref 97–108)
CO2 SERPL-SCNC: 31 MMOL/L (ref 21–32)
COMMENT, HOLDF: NORMAL
CREAT SERPL-MCNC: 0.79 MG/DL (ref 0.55–1.02)
DATE LAST DOSE: ABNORMAL
DATE LAST DOSE: ABNORMAL
DIAGNOSIS, 93000: NORMAL
DIAGNOSIS, 93000: NORMAL
DIFFERENTIAL METHOD BLD: ABNORMAL
EOSINOPHIL # BLD: 0.6 K/UL (ref 0–0.4)
EOSINOPHIL NFR BLD: 4 % (ref 0–7)
ERYTHROCYTE [DISTWIDTH] IN BLOOD BY AUTOMATED COUNT: 12.4 % (ref 11.5–14.5)
GLUCOSE SERPL-MCNC: 108 MG/DL (ref 65–100)
HCT VFR BLD AUTO: 44 % (ref 35–47)
HGB BLD-MCNC: 14.4 G/DL (ref 11.5–16)
IMM GRANULOCYTES # BLD AUTO: 0.1 K/UL (ref 0–0.04)
IMM GRANULOCYTES NFR BLD AUTO: 1 % (ref 0–0.5)
LITHIUM SERPL-SCNC: 1.7 MMOL/L (ref 0.6–1.2)
LITHIUM SERPL-SCNC: 1.85 MMOL/L (ref 0.6–1.2)
LYMPHOCYTES # BLD: 3.3 K/UL (ref 0.8–3.5)
LYMPHOCYTES NFR BLD: 24 % (ref 12–49)
MCH RBC QN AUTO: 31 PG (ref 26–34)
MCHC RBC AUTO-ENTMCNC: 32.7 G/DL (ref 30–36.5)
MCV RBC AUTO: 94.8 FL (ref 80–99)
MONOCYTES # BLD: 0.9 K/UL (ref 0–1)
MONOCYTES NFR BLD: 7 % (ref 5–13)
NEUTS SEG # BLD: 9.1 K/UL (ref 1.8–8)
NEUTS SEG NFR BLD: 63 % (ref 32–75)
NRBC # BLD: 0 K/UL (ref 0–0.01)
NRBC BLD-RTO: 0 PER 100 WBC
P-R INTERVAL, ECG05: 146 MS
P-R INTERVAL, ECG05: 208 MS
PLATELET # BLD AUTO: 239 K/UL (ref 150–400)
PMV BLD AUTO: 9.4 FL (ref 8.9–12.9)
POTASSIUM SERPL-SCNC: 2.9 MMOL/L (ref 3.5–5.1)
Q-T INTERVAL, ECG07: 450 MS
Q-T INTERVAL, ECG07: 484 MS
QRS DURATION, ECG06: 102 MS
QRS DURATION, ECG06: 90 MS
QTC CALCULATION (BEZET), ECG08: 450 MS
QTC CALCULATION (BEZET), ECG08: 487 MS
RBC # BLD AUTO: 4.64 M/UL (ref 3.8–5.2)
REPORTED DOSE,DOSE: ABNORMAL UNITS
REPORTED DOSE,DOSE: ABNORMAL UNITS
REPORTED DOSE/TIME,TMG: ABNORMAL
REPORTED DOSE/TIME,TMG: ABNORMAL
SAMPLES BEING HELD,HOLD: NORMAL
SODIUM SERPL-SCNC: 135 MMOL/L (ref 136–145)
T4 FREE SERPL-MCNC: 0.6 NG/DL (ref 0.8–1.5)
VENTRICULAR RATE, ECG03: 60 BPM
VENTRICULAR RATE, ECG03: 61 BPM
WBC # BLD AUTO: 14.1 K/UL (ref 3.6–11)

## 2020-12-22 PROCEDURE — 65660000000 HC RM CCU STEPDOWN

## 2020-12-22 PROCEDURE — 85025 COMPLETE CBC W/AUTO DIFF WBC: CPT

## 2020-12-22 PROCEDURE — 97116 GAIT TRAINING THERAPY: CPT | Performed by: PHYSICAL THERAPIST

## 2020-12-22 PROCEDURE — 74011250637 HC RX REV CODE- 250/637: Performed by: INTERNAL MEDICINE

## 2020-12-22 PROCEDURE — 36415 COLL VENOUS BLD VENIPUNCTURE: CPT

## 2020-12-22 PROCEDURE — 97161 PT EVAL LOW COMPLEX 20 MIN: CPT | Performed by: PHYSICAL THERAPIST

## 2020-12-22 PROCEDURE — 80178 ASSAY OF LITHIUM: CPT

## 2020-12-22 PROCEDURE — 74011000250 HC RX REV CODE- 250: Performed by: INTERNAL MEDICINE

## 2020-12-22 PROCEDURE — 94640 AIRWAY INHALATION TREATMENT: CPT

## 2020-12-22 PROCEDURE — 80048 BASIC METABOLIC PNL TOTAL CA: CPT

## 2020-12-22 PROCEDURE — 84439 ASSAY OF FREE THYROXINE: CPT

## 2020-12-22 RX ORDER — PROPRANOLOL HYDROCHLORIDE 80 MG/1
160 CAPSULE, EXTENDED RELEASE ORAL
Status: DISCONTINUED | OUTPATIENT
Start: 2020-12-22 | End: 2020-12-23 | Stop reason: HOSPADM

## 2020-12-22 RX ORDER — TOPIRAMATE 25 MG/1
50 TABLET ORAL
Status: DISCONTINUED | OUTPATIENT
Start: 2020-12-22 | End: 2020-12-23 | Stop reason: HOSPADM

## 2020-12-22 RX ORDER — ASCORBIC ACID 500 MG
500 TABLET ORAL 2 TIMES DAILY
Status: DISCONTINUED | OUTPATIENT
Start: 2020-12-22 | End: 2020-12-23 | Stop reason: HOSPADM

## 2020-12-22 RX ORDER — ESCITALOPRAM OXALATE 10 MG/1
40 TABLET ORAL DAILY
Status: DISCONTINUED | OUTPATIENT
Start: 2020-12-22 | End: 2020-12-22

## 2020-12-22 RX ORDER — QUETIAPINE FUMARATE 100 MG/1
800 TABLET, FILM COATED ORAL
Status: DISCONTINUED | OUTPATIENT
Start: 2020-12-22 | End: 2020-12-23 | Stop reason: HOSPADM

## 2020-12-22 RX ORDER — ALBUTEROL SULFATE 0.83 MG/ML
2.5 SOLUTION RESPIRATORY (INHALATION)
Status: DISCONTINUED | OUTPATIENT
Start: 2020-12-22 | End: 2020-12-23 | Stop reason: HOSPADM

## 2020-12-22 RX ORDER — PANTOPRAZOLE SODIUM 40 MG/1
40 TABLET, DELAYED RELEASE ORAL
Status: DISCONTINUED | OUTPATIENT
Start: 2020-12-23 | End: 2020-12-23 | Stop reason: HOSPADM

## 2020-12-22 RX ORDER — BUPROPION HYDROCHLORIDE 100 MG/1
100 TABLET, EXTENDED RELEASE ORAL 2 TIMES DAILY
Status: DISCONTINUED | OUTPATIENT
Start: 2020-12-22 | End: 2020-12-23 | Stop reason: HOSPADM

## 2020-12-22 RX ORDER — PROPRANOLOL HYDROCHLORIDE 40 MG/1
40 TABLET ORAL DAILY
Status: ON HOLD | COMMUNITY
End: 2021-03-16

## 2020-12-22 RX ORDER — PROPRANOLOL HYDROCHLORIDE 160 MG/1
160 CAPSULE, EXTENDED RELEASE ORAL
COMMUNITY

## 2020-12-22 RX ORDER — ATORVASTATIN CALCIUM 10 MG/1
10 TABLET, FILM COATED ORAL DAILY
Status: DISCONTINUED | OUTPATIENT
Start: 2020-12-22 | End: 2020-12-23 | Stop reason: HOSPADM

## 2020-12-22 RX ORDER — GABAPENTIN 600 MG/1
600 TABLET ORAL 3 TIMES DAILY
Status: DISCONTINUED | OUTPATIENT
Start: 2020-12-22 | End: 2020-12-22

## 2020-12-22 RX ORDER — PRIMIDONE 50 MG/1
50 TABLET ORAL
Status: DISCONTINUED | OUTPATIENT
Start: 2020-12-22 | End: 2020-12-23 | Stop reason: HOSPADM

## 2020-12-22 RX ORDER — ASENAPINE 10 MG/1
10 TABLET SUBLINGUAL
Status: DISCONTINUED | OUTPATIENT
Start: 2020-12-22 | End: 2020-12-22

## 2020-12-22 RX ORDER — PROPRANOLOL HYDROCHLORIDE 20 MG/1
40 TABLET ORAL EVERY MORNING
Status: DISCONTINUED | OUTPATIENT
Start: 2020-12-22 | End: 2020-12-23 | Stop reason: HOSPADM

## 2020-12-22 RX ADMIN — ATORVASTATIN CALCIUM 10 MG: 10 TABLET, FILM COATED ORAL at 11:24

## 2020-12-22 RX ADMIN — OXYCODONE HYDROCHLORIDE AND ACETAMINOPHEN 500 MG: 500 TABLET ORAL at 18:00

## 2020-12-22 RX ADMIN — PROPRANOLOL HYDROCHLORIDE 160 MG: 80 CAPSULE, EXTENDED RELEASE ORAL at 22:20

## 2020-12-22 RX ADMIN — PRIMIDONE 50 MG: 50 TABLET ORAL at 22:20

## 2020-12-22 RX ADMIN — BUPROPION HYDROCHLORIDE 100 MG: 100 TABLET, FILM COATED, EXTENDED RELEASE ORAL at 14:52

## 2020-12-22 RX ADMIN — PROPRANOLOL HYDROCHLORIDE 40 MG: 20 TABLET ORAL at 11:24

## 2020-12-22 RX ADMIN — QUETIAPINE FUMARATE 800 MG: 100 TABLET ORAL at 22:21

## 2020-12-22 RX ADMIN — IPRATROPIUM BROMIDE AND ALBUTEROL SULFATE 3 ML: .5; 3 SOLUTION RESPIRATORY (INHALATION) at 12:40

## 2020-12-22 RX ADMIN — BUPROPION HYDROCHLORIDE 100 MG: 100 TABLET, FILM COATED, EXTENDED RELEASE ORAL at 11:23

## 2020-12-22 RX ADMIN — OXYCODONE HYDROCHLORIDE AND ACETAMINOPHEN 500 MG: 500 TABLET ORAL at 11:23

## 2020-12-22 RX ADMIN — QUETIAPINE FUMARATE 800 MG: 100 TABLET ORAL at 03:03

## 2020-12-22 RX ADMIN — LURASIDONE HYDROCHLORIDE 60 MG: 60 TABLET, FILM COATED ORAL at 12:32

## 2020-12-22 RX ADMIN — TOPIRAMATE 50 MG: 25 TABLET, FILM COATED ORAL at 22:19

## 2020-12-22 NOTE — PROGRESS NOTES
Problem: Mobility Impaired (Adult and Pediatric)  Goal: *Acute Goals and Plan of Care (Insert Text)  Description: FUNCTIONAL STATUS PRIOR TO ADMISSION: Patient was independent and active without use of DME. Has a rollator and SPC if needed at home    [facility]: The patient lived with  but did not require assist.    Physical Therapy Goals  Initiated 12/22/2020  1. Patient will move from supine to sit and sit to supine  in bed with modified independence within 7 day(s). 2.  Patient will transfer from bed to chair and chair to bed with modified independence using the least restrictive device within 7 day(s). 3.  Patient will perform sit to stand with modified independence within 7 day(s). 4.  Patient will ambulate with modified independence for 200 feet with the least restrictive device within 7 day(s). Outcome: Progressing Towards Goal   PHYSICAL THERAPY EVALUATION  Patient: Ryan Edmonds (54 y.o. female)  Date: 12/22/2020  Primary Diagnosis: Lithium toxicity [T56.891A]        Precautions:   Fall    ASSESSMENT  Based on the objective data described below, the patient presents with decreased functional mobility from baseline level of function. Patient currently limited by impaired balance and gait instability but otherwise moving well. Overall needing supervision to come to EOB and CGA for sit to stand transfers. Amb approx 110 with CGA with no overt LOB but she is relatively unstable. Anticipate she will be safe to DC home with family support and suggest she uses RW at home. Would benefit from [facility] PT follow up for strengthening and mobility progression. Other factors to consider for discharge: at risk for falls     Patient will benefit from skilled therapy intervention to address the above noted impairments.        PLAN :  Recommendations and Planned Interventions: bed mobility training, transfer training, gait training, therapeutic exercises, neuromuscular re-education, patient and family training/education, and therapeutic activities      Frequency/Duration: Patient will be followed by physical therapy:  3 times a week to address goals. Recommendation for discharge: (in order for the patient to meet his/her long term goals)  Physical therapy at least 2 days/week in the home       IF patient discharges home will need the following DME: rollator         SUBJECTIVE:   Patient stated I would like some PT when I get home.     OBJECTIVE DATA SUMMARY:   HISTORY:    Past Medical History:   Diagnosis Date    Alcoholic ([address]     High cholesterol     Hypertension     Psychiatric disorder     Bipolar     Past Surgical History:   Procedure Laterality Date    HX GYN      D&C       Personal factors and/or comorbidities impacting plan of care:     Home Situation  Home Environment: Private residence  # Steps to Enter: (has stairs but uses ramp)  Wheelchair Ramp: Yes  One/Two Story Residence: One story  Living Alone: No  Support Systems: Spouse/Significant Other/Partner  Patient Expects to be Discharged to[de-identified] Private residence  Current DME Used/Available at Home: [place], straight, [place], rolling    EXAMINATION/PRESENTATION/DECISION MAKING:   Critical Behavior:  Neurologic State: Drowsy, Eyes open spontaneously  Orientation Level: Oriented X4  Cognition: Follows commands     Hearing:   Auditory  Auditory Impairment: None    Range Of Motion:  AROM: Generally decreased, functional                       Strength:    Strength: Generally decreased, functional       Functional Mobility:  Bed Mobility:  Rolling: Supervision  Supine to Sit: Supervision     Scooting: Supervision  Transfers:  Sit to Stand: Contact guard assistance  Stand to Sit: Contact guard assistance                       Balance:   Sitting: Intact  Standing: Impaired  Standing - Static: Good  Standing - Dynamic : Fair  Ambulation/Gait Training:  Distance (ft): 110 Feet (ft)  Assistive Device: Gait belt  Ambulation - Level of Assistance: Contact guard assistance     Gait Description (WDL): Exceptions to WDL  Gait Abnormalities: Decreased step clearance;Shuffling gait        Base of Support: Widened     Speed/Mandy: Pace decreased (<100 feet/min); Shuffled; Slow  Step Length: Left shortened;Right shortened          Pain Rating:  No c/o pain    Activity Tolerance:   Fair and requires rest breaks    After treatment patient left in no apparent distress:   Supine in bed, Call bell within reach, and Caregiver / family present    COMMUNICATION/EDUCATION:   The patients plan of care was discussed with: Physical therapist, Occupational therapist, and Registered nurse. Fall prevention education was provided and the patient/caregiver indicated understanding., Patient/family have participated as able in goal setting and plan of care. , and Patient/family agree to work toward stated goals and plan of care.     Thank you for this referral.  Sandra Aponte, PT, DPT   Time Calculation: 14 mins

## 2020-12-22 NOTE — PROGRESS NOTES
[address] [facility] Adult  Hospitalist Group                                                                                          Hospitalist Progress Note  Jimbo Staley MD  Answering service: [phone] OR [phone] from in house phone        Date of Service:  2020  NAME:  Andrew Scruggs  :  1970  MRN:  [id number]      Admission Summary:   Andrew Scruggs is a 48 y.o. female past medical history significant for bipolar disorder, hypertension and essential tremor presented emergency room complaining of nausea, vomiting and diarrhea for almost a month. Patient said initially symptoms were mild but in the last week has been progressively getting worse. She denies any abdominal pain, cough, chest pain, shortness of breath, fever, constipation or urinary symptoms. Work-up in the emergency room showed an elevated lithium level of 2.5. Interval history / Subjective:   Still with some nausea, but thinks she \"could eat\"  States her lithium level was just decreased as an outpatient a month ago from 900 daily to 600. Awaiting psychiatry input. Toxicology following as well. Has lower extremity tremors, which are improved. Has chronic upper extremity tremors which are at baseline. Assessment & Plan:     Lithium toxicity - moderate symptoms (N/V/trmors)  - IV fluids   - No indication for HD  - Poison control following, would like repeat level this afternoon  - Telemetry monitoring  - Psychiatry consult for home med recommendations for discharge    Bipolar disorder  - Resume home medications. Holding lithium as above  - Holding adderall and vyvanse. - Psychiatry consult for home med adjustment     Abnormal EKG - appears chronic.   - Cardiology consulted, no further workup   - Continue telemetry monitoring    Hypokalemia - replete and monitor     Essential tremor - resume home propanolol    Nicotine dependence - cessation discussed.    Elevated TSH - will need repeat testing in 4-6 weeks by PCP    Code status: FULL  DVT prophylaxis: SCDs    Care Plan discussed with: Patient/Family  Anticipated Disposition: Home w/Family  Anticipated Discharge: 24 hours to 48 hours     Hospital Problems  Date Reviewed: 8/18/2020          Codes Class Noted POA    Lithium toxicity ICD-10-CM: T96.059X  ICD-9-CM: 985.8, E980.9  12/21/2020 Unknown                Review of Systems:   A comprehensive review of systems was negative except for that written in the HPI. Vital Signs:    Last 24hrs VS reviewed since prior progress note. Most recent are:  Visit Vitals  BP (!) 101/43 (BP 1 Location: Left arm, BP Patient Position: At rest)   Pulse 68   Temp 98.4 °F (36.9 °C)   Resp 16   Ht 5' 6\" (1.676 m)   Wt 117.2 kg (258 lb 6.1 oz)   SpO2 98%   BMI 41.70 kg/m²         Intake/Output Summary (Last 24 hours) at 12/22/2020 1648  Last data filed at 12/22/2020 1016  Gross per 24 hour   Intake 2530 ml   Output 1700 ml   Net 830 ml        Physical Examination:     I had a face to face encounter with this patient and independently examined them on 12/22/2020 as outlined below:          Constitutional:  No acute distress, cooperative, pleasant, obese   ENT:  Oral mucosa moist, oropharynx benign. Resp:  CTA bilaterally. No wheezing/rhonchi/rales. No accessory muscle use   CV:  Regular rhythm, normal rate, no murmurs, gallops, rubs    GI:  Soft, non distended, non tender. normoactive bowel sounds, no hepatosplenomegaly     Musculoskeletal:  No edema, warm, 2+ pulses throughout    Neurologic:  Moves all extremities. AAOx3, CN II-XII reviewed, + upper and lower extremity tremors.       Skin:  Good turgor, no rashes or ulcers       Data Review:    Review and/or order of clinical lab test  Review and/or order of tests in the radiology section of CPT  Review and/or order of tests in the medicine section of CPT      Labs:     Recent Labs     12/22/20  0307 12/21/20  1540   WBC 14.1* 15.4*   HGB 14.4 16.1*   HCT 44.0 48.6*   PLT [address]     12/22/20  0307 12/21/20  1540   * 134*   K 2.9* 3.4*    95*   CO2 31 32   BUN 13 14   CREA 0.79 0.97   * 116*   CA 9.9 11.2*     Recent Labs     12/21/20  1540   ALT 38   *   TBILI 0.4   TP 7.9   ALB 4.2   GLOB 3.7   LPSE 459*     No results for input(s): INR, PTP, APTT, INREXT in the last 72 hours. No results for input(s): FE, TIBC, PSAT, FERR in the last 72 hours. No results found for: FOL, RBCF   No results for input(s): PH, PCO2, PO2 in the last 72 hours.   Recent Labs     12/21/20  1540   TROIQ <0.05     No results found for: CHOL, CHOLX, CHLST, CHOLV, HDL, HDLP, LDL, LDLC, DLDLP, TGLX, TRIGL, TRIGP, CHHD, CHHDX  Lab Results   Component Value Date/Time    Glucose (POC) 87 06/05/2012 06:47 PM    Glucose (POC) 109 (H) 06/05/2012 05:34 PM     Lab Results   Component Value Date/Time    Color PINK 12/21/2020 06:08 PM    Appearance HAZY (A) 12/21/2020 06:08 PM    Specific gravity >1.030 (H) 12/21/2020 06:08 PM    pH (UA) 7.0 12/21/2020 06:08 PM    Protein Negative 12/21/2020 06:08 PM    Glucose Negative 12/21/2020 06:08 PM    Ketone Negative 12/21/2020 06:08 PM    Bilirubin Negative 12/21/2020 06:08 PM    Urobilinogen 0.2 12/21/2020 06:08 PM    Nitrites Negative 12/21/2020 06:08 PM    Leukocyte Esterase Negative 12/21/2020 06:08 PM    Epithelial cells MODERATE (A) 12/21/2020 06:08 PM    Bacteria Negative 12/21/2020 06:08 PM    WBC 0-4 12/21/2020 06:08 PM    RBC >100 (H) 12/21/2020 06:08 PM         Medications Reviewed:     Current Facility-Administered Medications   Medication Dose Route Frequency    QUEtiapine (SEROquel) tablet 800 mg  800 mg Oral QHS    albuterol (PROVENTIL VENTOLIN) nebulizer solution 2.5 mg  2.5 mg Nebulization Q6H PRN    ascorbic acid (vitamin C) (VITAMIN C) tablet 500 mg  500 mg Oral BID    atorvastatin (LIPITOR) tablet 10 mg  10 mg Oral DAILY    buPROPion SR (WELLBUTRIN SR) tablet 100 mg  100 mg Oral BID    lurasidone (LATUDA) tablet 60 mg  60 mg Oral DAILY WITH BREAKFAST    [START ON 12/23/2020] pantoprazole (PROTONIX) tablet 40 mg  40 mg Oral ACB    primidone (MYSOLINE) tablet 50 mg  50 mg Oral QHS    propranolol LA (INDERAL LA) capsule 160 mg  160 mg Oral QHS    . PHARMACY TO SUBSTITUTE PER PROTOCOL (Reordered from: [facility] 1 mg tab tablet)    Per Protocol    topiramate (TOPAMAX) tablet 50 mg  50 mg Oral QHS    propranoloL (INDERAL) tablet 40 mg  40 mg Oral QAM    0.9% sodium chloride infusion  125 mL/hr IntraVENous CONTINUOUS    ondansetron (ZOFRAN) injection 4 mg  4 mg IntraVENous Q4H PRN    albuterol-ipratropium (DUO-NEB) 2.5 MG-0.5 MG/3 ML  3 mL Nebulization Q6H PRN     ______________________________________________________________________  EXPECTED LENGTH OF STAY: 2d 7h  ACTUAL LENGTH OF STAY:          1                 Bibiana Sevilla MD

## 2020-12-22 NOTE — PROGRESS NOTES
1100 - Reason for Admission:                      RUR Score:    11 %                 Plan for utilizing home health:   No . Patient has no DME or services in the home. PCP: First and Last name:  Jose Thomas   Name of Practice:    Are you a current patient: Yes/No: Yes   Approximate date of last visit: 2 months ago   Can you participate in a virtual visit with your PCP:  No. Michel office visit. Current Advanced Directive/Advance Care Plan: None                         Transition of Care Plan:  Home with  of 17 years. Care Management Interventions  PCP Verified by CM: Yes  Mode of Transport at Discharge:  Other (see comment)  MyChart Signup: No  Discharge Durable Medical Equipment: No  Health Maintenance Reviewed: Yes  Physical Therapy Consult: No  Occupational Therapy Consult: No  Speech Therapy Consult: No  Current Support Network: Lives with Spouse  Confirm Follow Up Transport: Family  Discharge Location  Discharge Placement: Home

## 2020-12-22 NOTE — CONSULTS
PSYCHIATRY CONSULT NOTE:    REASON FOR CONSULT: lithium toxicity      HISTORY OF PRESENTING COMPLAINT:  Jameson Byrnes is a 48 y.o. WHITE [place]  female who is currently admitted to the medical floor at [facility] for lithium toxicity. Her outpatient provider had recently decreased her lithium dosage after level was 1.5, but patient's lithium level continued to rise. Robley Hatchet reports that her mood has been well managed by her medication regimen. She denies SI/HI/AH/VH. She has been on lithium for approximately 20 years with no previous episodes of toxicity. Identifies her significant other as her biggest support. She has a good relationship with her outpatient psychiatric provider and good support at home. PAST PSYCHIATRIC HISTORY and SUBSTANCE ABUSE HISTORY:  Multiple inpatient psychiatric hospitalizations in the past, last in 2002  History of alcohol abuse      PAST MEDICAL HISTORY:  Please see H&P for details. Past Medical History:   Diagnosis Date    Alcoholic ([address]     High cholesterol     Hypertension     Psychiatric disorder     Bipolar           Lab Results   Component Value Date/Time    WBC 14.1 (H) 12/22/2020 03:07 AM    HGB 14.4 12/22/2020 03:07 AM    HCT 44.0 12/22/2020 03:07 AM    PLATELET 770 37/84/7075 03:07 AM    MCV 94.8 12/22/2020 03:07 AM      Lab Results   Component Value Date/Time    Sodium 135 (L) 12/22/2020 03:07 AM    Potassium 2.9 (L) 12/22/2020 03:07 AM    Chloride 100 12/22/2020 03:07 AM    CO2 31 12/22/2020 03:07 AM    Anion gap 4 (L) 12/22/2020 03:07 AM    Glucose 108 (H) 12/22/2020 03:07 AM    BUN 13 12/22/2020 03:07 AM    Creatinine 0.79 12/22/2020 03:07 AM    BUN/Creatinine ratio 16 12/22/2020 03:07 AM    GFR est AA >60 12/22/2020 03:07 AM    GFR est non-AA >60 12/22/2020 03:07 AM    Calcium 9.9 12/22/2020 03:07 AM    Bilirubin, total 0.4 12/21/2020 03:40 PM    Alk.  phosphatase 142 (H) 12/21/2020 03:40 PM    Protein, total 7.9 12/21/2020 03:40 PM    Albumin 4.2 12/21/2020 03:40 PM    Globulin 3.7 12/21/2020 03:40 PM    A-G Ratio 1.1 12/21/2020 03:40 PM    ALT (SGPT) 38 12/21/2020 03:40 PM          PSYCHOSOCIAL HISTORY:  Lives with significant other      MENTAL STATUS EXAM:    General appearance: appropriately   groomed, psychomotor activity is wnl  Eye contact: good  Speech: Spontaneous, fluent  Affect : full range  Mood: \"good \"  Thought Process: Logical, goal directed  Perception: Denies AH or VH. Thought Content: Denies SI or Plan  Insight: Partial  Judgement: Fair  Cognition: Intact grossly. ASSESSMENT AND PLAN:  Angelina Coleman meets criteria for a diagnosis of Bipolar 1 Disorder. Recommendations:  1. Hold lithium while inpatient  2. Continue the rest of her outpatient psychotropic medications  3. Follow up with her outpatient provider for longer term medication management. Cheli Ramirez states that she had been wanting to get off of Lithium but did not want to change a combination of medications that helped her maintain stability. She has a good relationship with her outpatient provider. Thank you for the consultation.     Mala Dumont, PMLUZ ELENAP-BC  December 22, 2020

## 2020-12-22 NOTE — PROGRESS NOTES
Pharmacist Admission Medication Reconciliation:    Reviewed patient's medications with Rx Query outpatient refill data, and phone interview with Ms Jose Randall. She used a medication list on her phone, which she keeps updated with her psychiatric medications. She has had multiple medication changes/adjustments recently. Rx Query data available? ¹ YES (some)  Reviewed active and held orders. Yes - reviewing w/ attending. Medication changes (since last review): No longer taking: Saphris (asenapine), Lexapro, gabapentin, iron supplement. Changed Wellbutrin from 200 mg daily to 100 mg BID. Changed Latuda to 60 mg daily. Most recent lithium dose was 600 mg twice a day. Propranolol is  mg HS, 40 mg QAM  Topiramate - patient reports 50 mg HS    Thank you for allowing me to participate in this patient's care. Please call [phone] or [phone] with any questions. Delma Grubbs, Pharmacist          [facility] pharmacy benefit data reflects medications filled and processed through the patient's insurance,   however this data does NOT capture whether the medication was picked up or is currently being taken by the patient. Prior to Admission Medications:   Prior to Admission Medications   Prescriptions Last Dose Informant Taking? Latuda 60 mg tab tablet 12/20/2020 at Unknown time  Yes   Sig: Take 60 mg by mouth nightly. QUEtiapine (SEROQUEL) 400 mg tablet   Yes   Sig: Take 800 mg by mouth nightly. Indications: DEPRESSION TREATMENT ADJUNCT   Rexulti 1 mg tab tablet 12/20/2020 at Unknown time  Yes   Sig: TAKE 1 TABLET BY MOUTH AT BEDTIME   albuterol (PROVENTIL HFA, VENTOLIN HFA, PROAIR HFA) 90 mcg/actuation inhaler   Yes   Sig: Take 2 Puffs by inhalation every four (4) hours as needed for Wheezing. ascorbic acid, vitamin C, (Vitamin C) 500 mg tablet 12/20/2020 at Unknown time  Yes   Sig: Take 1 Tab by mouth two (2) times a day.    atorvastatin (LIPITOR) 10 mg tablet   Yes   Sig: Take 10 mg by mouth daily.   buPROPion SR (WELLBUTRIN SR) 100 mg SR tablet   Yes   Sig: Take 100 mg by mouth two (2) times a day. Indications: depression   cyanocobalamin (VITAMIN B12) 1,000 mcg/mL injection   Yes   Sig: INJECT 1ML INTRAMUSCULARLY TWICE A WEEK. dextroamphetamine-amphetamine (ADDERALL) 20 mg tablet   Yes   Sig: Take 20 mg by mouth two (2) times a day. Morning and 3 PM   eszopiclone (LUNESTA) 3 mg tablet 12/20/2020 at Unknown time  Yes   Sig: Take 3 mg by mouth nightly as needed. hydroCHLOROthiazide (HYDRODIURIL) 25 mg tablet   Yes   lisdexamfetamine (Vyvanse) 50 mg cap   Yes   Sig: Take 50 mg by mouth daily. lithium 300 mg tablet   Yes   Sig: Take 600 mg by mouth two (2) times a day. Indications: manic-depression   omeprazole (PRILOSEC) 40 mg capsule 12/20/2020 at Unknown time  Yes   Sig: TAKE 1 CAPSULE BY MOUTH EVERY DAY   primidone (MYSOLINE) 50 mg tablet 12/20/2020 at Unknown time  Yes   Sig: Take 1 Tab by mouth nightly. Indications: essential tremor   propranoloL (INDERAL) 40 mg tablet   Yes   Sig: Take 40 mg by mouth daily. propranolol LA (INDERAL LA) 160 mg capsule   Yes   Sig: Take 160 mg by mouth nightly. topiramate (Topamax) 50 mg tablet 12/20/2020 at Unknown time  Yes   Sig: Take 50 mg by mouth nightly. Indications: alcoholism   zinc sulfate 220 mg tablet 12/20/2020 at Unknown time  Yes   Sig: Take 1 Tab by mouth daily. Facility-Administered Medications: None    Allergies:  Patient has no known allergies.

## 2020-12-22 NOTE — CONSULTS
CARDIOLOGY CONSULT                  Subjective:    Date of  Admission:   Admission type:Emergency    Tomasz Uriostegui MD     This is a 48 yof with bipolar, HTN here with lithium toxicity. She had recently been noted to have an eelvated level and her dose was decreased but ED level was 2.5. EKG was obtained which arose concerns for a STEMI. She has not had any chest pain or any other symptoms which could be construed as being anginal. Transferred to [facility] overnight and has had no issues.     Patient Active Problem List   Diagnosis Code    Syncope and collapse R55    Lithium toxicity T56.891A        Past Medical History:   Diagnosis Date    Alcoholic ([facility] [address]     High cholesterol     Hypertension     Psychiatric disorder     Bipolar      Past Surgical History:   Procedure Laterality Date    HX GYN      D&C      Family History   Problem Relation Age of Onset    Alcohol abuse Mother     Heart Disease Mother       Social History     Socioeconomic History    Marital status:      Spouse name: Not on file    Number of children: Not on file    Years of education: Not on file    Highest education level: Not on file   Occupational History    Not on file   Social Needs    Financial resource strain: Not on file    Food insecurity     Worry: Not on file     Inability: Not on file    Transportation needs     Medical: Not on file     Non-medical: Not on file   Tobacco Use    Smoking status: Current Every Day Smoker     Packs/day: 0.50    Smokeless tobacco: Never Used   Substance and Sexual Activity    Alcohol use: No    Drug use: No    Sexual activity: Yes     Partners: Male     Birth control/protection: None   Lifestyle    Physical activity     Days per week: Not on file     Minutes per session: Not on file    Stress: Not on file   Relationships    Social connections     Talks on phone: Not on file     Gets together: Not on file     Attends Congregational service: Not on file Active member of club or organization: Not on file     Attends meetings of clubs or organizations: Not on file     Relationship status: Not on file    Intimate partner violence     Fear of current or ex partner: Not on file     Emotionally abused: Not on file     Physically abused: Not on file     Forced sexual activity: Not on file   Other Topics Concern    Not on file   Social History Narrative    Not on file        Current Facility-Administered Medications   Medication Dose Route Frequency    QUEtiapine (SEROquel) tablet 800 mg  800 mg Oral QHS    albuterol (PROVENTIL VENTOLIN) nebulizer solution 2.5 mg  2.5 mg Nebulization Q6H PRN    ascorbic acid (vitamin C) (VITAMIN C) tablet 500 mg  500 mg Oral BID    asenapine maleate (SAPHRIS) subLINGual tablet 10 mg  10 mg SubLINGual QHS    atorvastatin (LIPITOR) tablet 10 mg  10 mg Oral DAILY    buPROPion SR (WELLBUTRIN SR) tablet 100 mg  100 mg Oral BID    escitalopram oxalate (LEXAPRO) tablet 40 mg  40 mg Oral DAILY    gabapentin (NEURONTIN) tablet 600 mg  600 mg Oral TID    [START ON 12/23/2020] lurasidone (LATUDA) tablet 40 mg  40 mg Oral DAILY WITH BREAKFAST    [START ON 12/23/2020] pantoprazole (PROTONIX) tablet 40 mg  40 mg Oral ACB    primidone (MYSOLINE) tablet 50 mg  50 mg Oral QHS    propranolol LA (INDERAL LA) capsule 160 mg  160 mg Oral DAILY    . PHARMACY TO SUBSTITUTE PER PROTOCOL (Reordered from: Rexulti 1 mg tab tablet)    Per Protocol    topiramate (TOPAMAX) tablet 100 mg  100 mg Oral QHS    0.9% sodium chloride infusion  125 mL/hr IntraVENous CONTINUOUS    ondansetron (ZOFRAN) injection 4 mg  4 mg IntraVENous Q4H PRN    albuterol-ipratropium (DUO-NEB) 2.5 MG-0.5 MG/3 ML  3 mL Nebulization Q6H PRN             Review of Symptoms:    Gen - no F/C/S  Eyes - no vision changes  ENT - no sore throat, rhinorrhea, otalgia  CV - no CP, no palpitations, no orthopnea, no PND, no BRANNON  Resp no cough, no SOB/MCLEAN  GI - no AP, no n/v/d/c   - no dysuria, no hematuria  MSK - no abnormal joint pains  Skin - no rashes  Neuro - no HA, no numbness, no weakness, no slurred speech  Psych - no change in mood       Physical Exam    BP (!) 105/56 (BP 1 Location: Left arm, BP Patient Position: At rest)   Pulse 74   Temp 98 °F (36.7 °C)   Resp 15   Ht 5' 6\" (1.676 m)   Wt 117.2 kg (258 lb 6.1 oz)   LMP 12/18/2020   SpO2 98%   BMI 41.70 kg/m²      NAD  Skin warm and dry  Nl conjunctiva  Oropharynx without exudate. Neck supple  Lungs clear  Normal S1/ S2 with occasional ectopy  No Murmurs, click or Rubs  Abdomen soft and non tender  Pulses 2+ radials  Neuro:  Grossly intact  Appropriate       Cardiographics    Telemetry: normal sinus rhythm  ECG: normal sinus rhythm, nonspecific ST and T waves changes      Assessment: This is a 48 yof with lithium toxicity consulted for abnormal EKG. Although EKG can change with lithium toxicity with bradycardia, QTc prolongation and TWI reported, there are not typically malignant arrhythmias. EKG is fairly similar c/w previous study in 2014 with subtle elevations in Lead III.     Plan:    Continue to monitor on telemetry  Agree with statin, BB  Monitor electrolytes  No IP cardiac plans    Signing off, please call with questions

## 2020-12-22 NOTE — H&P
[address] [facility] Adult  Hospitalist Group  History and Physical    Primary Care Provider: Laura Martel MD  Date of Service:  12/22/2020    Subjective:     Abdi Vera is a 48 y.o. female past medical history significant for bipolar disorder, hypertension and essential tremor presented emergency room complaining of nausea, vomiting and diarrhea for almost a month. Patient said initially symptoms were mild but in the last week has been progressively getting worse. She denies any abdominal pain, cough, chest pain, shortness of breath, fever, constipation or urinary symptoms. Work-up in the emergency room showed an elevated lithium level of 2.5. Patient states that recently her psychiatrist decreased her dose of lithium due to elevated numbers but it was only 1.5. She reports compliant with her medications and been taking them properly. She reports improvement of her symptoms after management in the emergency room. Review of Systems:    Review of Systems   Constitutional: Negative for chills, fever, malaise/fatigue and weight loss. HENT: Negative for congestion, ear discharge, sore throat and tinnitus. Eyes: Negative for blurred vision and double vision. Respiratory: Negative for cough, sputum production, shortness of breath and wheezing. Cardiovascular: Negative for chest pain, palpitations, orthopnea, leg swelling and PND. Gastrointestinal: Positive for diarrhea, nausea and vomiting. Negative for abdominal pain, constipation and melena. Genitourinary: Negative for dysuria, frequency and urgency. Musculoskeletal: Negative for back pain, joint pain and myalgias. Skin: Negative for itching and rash. Neurological: Positive for tremors. Negative for dizziness, tingling, sensory change, speech change, focal weakness, seizures, loss of consciousness, weakness and headaches. Endo/Heme/Allergies: Negative for polydipsia. Does not bruise/bleed easily.      Past Medical History:   Diagnosis Date    Alcoholic ([facility] [address]     High cholesterol     Hypertension     Psychiatric disorder     Bipolar      Past Surgical History:   Procedure Laterality Date    HX GYN      D&C     Prior to Admission medications    Medication Sig Start Date End Date Taking? Authorizing Provider   lisdexamfetamine (Vyvanse) 50 mg cap Take 50 mg by mouth daily. Yes Other, MD Sharri   Slow Release Iron 143 mg (45 mg iron) TbER PLEASE SEE ATTACHED FOR DETAILED DIRECTIONS 12/11/20  Yes Other, MD Sharri   omeprazole (PRILOSEC) 40 mg capsule TAKE 1 CAPSULE BY MOUTH EVERY DAY 10/23/20  Yes Other, MD Sharri   Rexulti 1 mg tab tablet TAKE 1 TABLET BY MOUTH AT BEDTIME 6/17/20  Yes Provider, Historical   eszopiclone (LUNESTA) 3 mg tablet Take 3 mg by mouth nightly as needed. 7/21/20  Yes Provider, Historical   hydroCHLOROthiazide (HYDRODIURIL) 25 mg tablet  7/21/20  Yes Provider, Historical   Latuda 40 mg tab tablet Take 40 mg by mouth. 8/11/20  Yes Provider, Historical   dextroamphetamine-amphetamine (ADDERALL) 20 mg tablet Take 20 mg by mouth two (2) times a day. 8/4/20  Yes Provider, Historical   propranolol LA (INDERAL LA) 160 mg capsule Take 1 Cap by mouth daily. 8/18/20  Yes Wen Albarado MD   primidone (MYSOLINE) 50 mg tablet Take 1 Tab by mouth nightly. Indications: essential tremor 8/18/20  Yes Wen Albarado MD   ascorbic acid, vitamin C, (Vitamin C) 500 mg tablet Take 1 Tab by mouth two (2) times a day. 7/20/20  Yes Jesika Camarillo PA-C   zinc sulfate 220 mg tablet Take 1 Tab by mouth daily. 7/20/20  Yes Jesika Camarillo PA-C   lithium 300 mg tablet Take 300 mg by mouth three (3) times daily. Indications: BIPOLAR DISORDER   Yes Provider, Historical   buPROPion SR (WELLBUTRIN SR) 100 mg SR tablet Take 200 mg by mouth daily. Indications: DEPRESSION   Yes Provider, Historical   topiramate (TOPAMAX) 100 mg tablet Take 100 mg by mouth nightly.  Indications: ALCOHOLISM   Yes Provider, Historical cyanocobalamin (VITAMIN B12) 1,000 mcg/mL injection INJECT 1ML INTRAMUSCULARLY TWICE A WEEK. 12/11/20   Other, MD Sharri   atorvastatin (LIPITOR) 10 mg tablet Take 10 mg by mouth daily. 7/21/20   Provider, Historical   gabapentin (NEURONTIN) 600 mg tablet Take 600 mg by mouth three (3) times daily. 7/21/20   Provider, Historical   albuterol (PROVENTIL HFA, VENTOLIN HFA, PROAIR HFA) 90 mcg/actuation inhaler Take 2 Puffs by inhalation every four (4) hours as needed for Wheezing. 7/20/20   Sharad Clayton PA-C   ondansetron hcl (ZOFRAN) 4 mg tablet Take 1 Tab by mouth every eight (8) hours as needed for Nausea or Vomiting. 7/20/20   Sharad Clayton PA-C   asenapine (SAPHRIS, BLACK CHERRY,) 10 mg subl 10 mg by SubLINGual route nightly. Provider, Historical   amphetamine-dextroamphetamine (ADDERALL) 30 mg tablet Take 30 mg by mouth daily. Provider, Historical   escitalopram (LEXAPRO) 20 mg tablet Take 40 mg by mouth daily. Indications: DEPRESSION ASSOCIATED WITH MANIC DEPRESSIVE DISORDER    Provider, Historical   QUEtiapine (SEROQUEL) 400 mg tablet Take 800 mg by mouth nightly. Indications: DEPRESSION TREATMENT ADJUNCT    Provider, Historical     No Known Allergies   Family History   Problem Relation Age of Onset    Alcohol abuse Mother     Heart Disease Mother         SOCIAL HISTORY:  Patient resides at home. Patient ambulates independently. Smoking history: Active tobacco user.   Alcohol history: Denies alcohol abuse        Objective:       Physical Exam:   Patient Vitals for the past 12 hrs:   Temp Pulse Resp BP SpO2   12/21/20 1846  (!) 58 22  93 %   12/21/20 1845  (!) 59 23 121/64    12/21/20 1833 98.2 °F (36.8 °C) (!) 59 21  94 %   12/21/20 1830  (!) 58 24 (!) 121/59    12/21/20 1815  60 23 127/69    12/21/20 1814  60 18  94 %   12/21/20 1752  64   95 %   12/21/20 1738  60   94 %   12/21/20 1727  64   96 %   12/21/20 1723    (!) 126/58 91 %         Data Review:   Recent Results (from the past 24 hour(s))   SAMPLES BEING HELD    Collection Time: 12/21/20  3:40 PM   Result Value Ref Range    SAMPLES BEING HELD 1BLUE     COMMENT        Add-on orders for these samples will be processed based on acceptable specimen integrity and analyte stability, which may vary by analyte. CBC WITH AUTOMATED DIFF    Collection Time: 12/21/20  3:40 PM   Result Value Ref Range    WBC 15.4 (H) 3.6 - 11.0 K/uL    RBC 5.19 3.80 - 5.20 M/uL    HGB 16.1 (H) 11.5 - 16.0 g/dL    HCT 48.6 (H) 35.0 - 47.0 %    MCV 93.6 80.0 - 99.0 FL    MCH 31.0 26.0 - 34.0 PG    MCHC 33.1 30.0 - 36.5 g/dL    RDW 12.5 11.5 - 14.5 %    PLATELET 286 035 - 245 K/uL    MPV 9.1 8.9 - 12.9 FL    NRBC 0.0 0  WBC    ABSOLUTE NRBC 0.00 0.00 - 0.01 K/uL    NEUTROPHILS 67 32 - 75 %    LYMPHOCYTES 21 12 - 49 %    MONOCYTES 6 5 - 13 %    EOSINOPHILS 4 0 - 7 %    BASOPHILS 1 0 - 1 %    IMMATURE GRANULOCYTES 1 (H) 0.0 - 0.5 %    ABS. NEUTROPHILS 10.3 (H) 1.8 - 8.0 K/UL    ABS. LYMPHOCYTES 3.2 0.8 - 3.5 K/UL    ABS. MONOCYTES 0.9 0.0 - 1.0 K/UL    ABS. EOSINOPHILS 0.6 (H) 0.0 - 0.4 K/UL    ABS. BASOPHILS 0.2 (H) 0.0 - 0.1 K/UL    ABS. IMM. GRANS. 0.2 (H) 0.00 - 0.04 K/UL    DF AUTOMATED      RBC COMMENTS ANISOCYTOSIS  1+       METABOLIC PANEL, COMPREHENSIVE    Collection Time: 12/21/20  3:40 PM   Result Value Ref Range    Sodium 134 (L) 136 - 145 mmol/L    Potassium 3.4 (L) 3.5 - 5.1 mmol/L    Chloride 95 (L) 97 - 108 mmol/L    CO2 32 21 - 32 mmol/L    Anion gap 7 5 - 15 mmol/L    Glucose 116 (H) 65 - 100 mg/dL    BUN 14 6 - 20 MG/DL    Creatinine 0.97 0.55 - 1.02 MG/DL    BUN/Creatinine ratio 14 12 - 20      GFR est AA >60 >60 ml/min/1.73m2    GFR est non-AA >60 >60 ml/min/1.73m2    Calcium 11.2 (H) 8.5 - 10.1 MG/DL    Bilirubin, total 0.4 0.2 - 1.0 MG/DL    ALT (SGPT) 38 12 - 78 U/L    AST (SGOT) 29 15 - 37 U/L    Alk.  phosphatase 142 (H) 45 - 117 U/L    Protein, total 7.9 6.4 - 8.2 g/dL    Albumin 4.2 3.5 - 5.0 g/dL    Globulin 3.7 2.0 - 4.0 g/dL    A-G Ratio 1.1 1.1 - 2.2     LIPASE    Collection Time: 12/21/20  3:40 PM   Result Value Ref Range    Lipase 459 (H) 73 - 393 U/L   LITHIUM    Collection Time: 12/21/20  3:40 PM   Result Value Ref Range    Lithium level 2.50 (HH) 0.60 - 1.20 MMOL/L    Reported dose date Not performed      Reported dose time: Not performed      Reported dose: Not performed UNITS   TSH 3RD GENERATION    Collection Time: 12/21/20  3:40 PM   Result Value Ref Range    TSH 8.04 (H) 0.36 - 3.74 uIU/mL   TROPONIN I    Collection Time: 12/21/20  3:40 PM   Result Value Ref Range    Troponin-I, Qt. <0.05 <0.05 ng/mL   EKG, 12 LEAD, INITIAL    Collection Time: 12/21/20  4:49 PM   Result Value Ref Range    Ventricular Rate 61 BPM    Atrial Rate 61 BPM    P-R Interval 146 ms    QRS Duration 102 ms    Q-T Interval 484 ms    QTC Calculation (Bezet) 487 ms    Calculated P Axis 35 degrees    Calculated R Axis 68 degrees    Calculated T Axis 102 degrees    Diagnosis       ** Age and gender specific ECG analysis **  Normal sinus rhythm  Incomplete right bundle branch block  ST elevation, consider inferior injury or acute infarct  ** ** ACUTE MI / STEMI ** **  Consider right ventricular involvement in acute inferior infarct  Abnormal ECG  When compared with ECG of 09-NOV-2014 16:11,  T wave inversion now evident in Lateral leads  QT has lengthened     URINALYSIS W/MICROSCOPIC    Collection Time: 12/21/20  6:08 PM   Result Value Ref Range    Color PINK      Appearance HAZY (A) CLEAR      Specific gravity >1.030 (H) 1.003 - 1.030    pH (UA) 7.0 5.0 - 8.0      Protein Negative NEG mg/dL    Glucose Negative NEG mg/dL    Ketone Negative NEG mg/dL    Bilirubin Negative NEG      Blood LARGE (A) NEG      Urobilinogen 0.2 0.2 - 1.0 EU/dL    Nitrites Negative NEG      Leukocyte Esterase Negative NEG      WBC 0-4 0 - 4 /hpf    RBC >100 (H) 0 - 5 /hpf    Epithelial cells MODERATE (A) FEW /lpf    Bacteria Negative NEG /hpf   URINE CULTURE HOLD SAMPLE    Collection Time: 12/21/20  6:08 PM    Specimen: Serum; Urine   Result Value Ref Range    Urine culture hold        Urine on hold in Microbiology dept for 2 days. If unpreserved urine is submitted, it cannot be used for addtional testing after 24 hours, recollection will be required. Assessment:     Active Problems:    Lithium toxicity (12/21/2020)        Plan:     1. Lithium toxicity: Patient presented with moderate symptoms of lithium toxicity including nausea, vomiting and worsening tremors. Symptoms improved with IV fluids. Toxicology consulted. No indication for HD.  -Supportive care with IV fluids normal saline 125 an hour  Monitor sodium level every 6 hours to monitor for hyponatremia/nephrogenic DI  -Cardiac telemetry to monitor QTC  Lithium level every 6 hours    2. Bipolar disorder: We will hold lithium for now. Will need to follow with a psychiatrist for medication adjustment. Continue other home medication. Not acute exacerbation. 3. Essential tremors: Exacerbated by lithium toxicity.    -On propanolol at home. 4.  Tobacco use disorder: Importance of tobacco cessation discussed with patient. No history of COPD or asthma. No wheezing on exam however patient denies shortness of breath, wheezing, cough. Will put DuoNebs as needed. 5.  Leukocytosis: No left shift. Likely reactive. No signs of infection at the time. Will hold antibiotic for now to monitor. 6. Elevated TSH: no h/o thyroid disease. Will benefit for Synthroid. .  Will check free T4. Also could be related to lithium toxicity.     DVT prophylaxis: Lovenox  CODE STATUS: Full code    FUNCTIONAL STATUS PRIOR TO HOSPITALIZATION Ambulates Independently    Signed By: Ace Diaz MD     December 22, 2020

## 2020-12-22 NOTE — PROGRESS NOTES
Medicare pt has received, reviewed, and signed 1st IM letter informing them of their right to appeal the discharge. Signed copy has been placed on pt bedside chart.     RADHA Panda

## 2020-12-23 VITALS
OXYGEN SATURATION: 94 % | WEIGHT: 262.35 LBS | DIASTOLIC BLOOD PRESSURE: 47 MMHG | TEMPERATURE: 98.2 F | SYSTOLIC BLOOD PRESSURE: 117 MMHG | RESPIRATION RATE: 22 BRPM | BODY MASS INDEX: 42.16 KG/M2 | HEART RATE: 53 BPM | HEIGHT: 66 IN

## 2020-12-23 PROBLEM — R25.1 TREMORS OF NERVOUS SYSTEM: Status: ACTIVE | Noted: 2020-12-23

## 2020-12-23 PROBLEM — R11.2 NAUSEA AND VOMITING: Status: ACTIVE | Noted: 2020-12-23

## 2020-12-23 LAB
ANION GAP SERPL CALC-SCNC: 5 MMOL/L (ref 5–15)
BASOPHILS # BLD: 0.1 K/UL (ref 0–0.1)
BASOPHILS NFR BLD: 1 % (ref 0–1)
BUN SERPL-MCNC: 9 MG/DL (ref 6–20)
BUN/CREAT SERPL: 13 (ref 12–20)
CALCIUM SERPL-MCNC: 9.4 MG/DL (ref 8.5–10.1)
CHLORIDE SERPL-SCNC: 103 MMOL/L (ref 97–108)
CO2 SERPL-SCNC: 30 MMOL/L (ref 21–32)
CREAT SERPL-MCNC: 0.7 MG/DL (ref 0.55–1.02)
DIFFERENTIAL METHOD BLD: NORMAL
EOSINOPHIL # BLD: 0.4 K/UL (ref 0–0.4)
EOSINOPHIL NFR BLD: 4 % (ref 0–7)
ERYTHROCYTE [DISTWIDTH] IN BLOOD BY AUTOMATED COUNT: 12.7 % (ref 11.5–14.5)
GLUCOSE SERPL-MCNC: 101 MG/DL (ref 65–100)
HCT VFR BLD AUTO: 40.7 % (ref 35–47)
HGB BLD-MCNC: 13.5 G/DL (ref 11.5–16)
IMM GRANULOCYTES # BLD AUTO: 0 K/UL (ref 0–0.04)
IMM GRANULOCYTES NFR BLD AUTO: 0 % (ref 0–0.5)
LYMPHOCYTES # BLD: 2.7 K/UL (ref 0.8–3.5)
LYMPHOCYTES NFR BLD: 28 % (ref 12–49)
MAGNESIUM SERPL-MCNC: 1.9 MG/DL (ref 1.6–2.4)
MCH RBC QN AUTO: 31.2 PG (ref 26–34)
MCHC RBC AUTO-ENTMCNC: 33.2 G/DL (ref 30–36.5)
MCV RBC AUTO: 94 FL (ref 80–99)
MONOCYTES # BLD: 0.5 K/UL (ref 0–1)
MONOCYTES NFR BLD: 6 % (ref 5–13)
NEUTS SEG # BLD: 5.8 K/UL (ref 1.8–8)
NEUTS SEG NFR BLD: 61 % (ref 32–75)
NRBC # BLD: 0 K/UL (ref 0–0.01)
NRBC BLD-RTO: 0 PER 100 WBC
PHOSPHATE SERPL-MCNC: 2.6 MG/DL (ref 2.6–4.7)
PLATELET # BLD AUTO: 200 K/UL (ref 150–400)
PMV BLD AUTO: 9.2 FL (ref 8.9–12.9)
POTASSIUM SERPL-SCNC: 3.2 MMOL/L (ref 3.5–5.1)
RBC # BLD AUTO: 4.33 M/UL (ref 3.8–5.2)
SODIUM SERPL-SCNC: 138 MMOL/L (ref 136–145)
WBC # BLD AUTO: 9.5 K/UL (ref 3.6–11)

## 2020-12-23 PROCEDURE — 74011250637 HC RX REV CODE- 250/637: Performed by: INTERNAL MEDICINE

## 2020-12-23 PROCEDURE — 84100 ASSAY OF PHOSPHORUS: CPT

## 2020-12-23 PROCEDURE — 36415 COLL VENOUS BLD VENIPUNCTURE: CPT

## 2020-12-23 PROCEDURE — 80048 BASIC METABOLIC PNL TOTAL CA: CPT

## 2020-12-23 PROCEDURE — 85025 COMPLETE CBC W/AUTO DIFF WBC: CPT

## 2020-12-23 PROCEDURE — 97535 SELF CARE MNGMENT TRAINING: CPT

## 2020-12-23 PROCEDURE — 83735 ASSAY OF MAGNESIUM: CPT

## 2020-12-23 PROCEDURE — 97165 OT EVAL LOW COMPLEX 30 MIN: CPT

## 2020-12-23 RX ORDER — POTASSIUM CHLORIDE 750 MG/1
40 TABLET, FILM COATED, EXTENDED RELEASE ORAL
Status: COMPLETED | OUTPATIENT
Start: 2020-12-23 | End: 2020-12-23

## 2020-12-23 RX ADMIN — PANTOPRAZOLE SODIUM 40 MG: 40 TABLET, DELAYED RELEASE ORAL at 07:00

## 2020-12-23 RX ADMIN — ATORVASTATIN CALCIUM 10 MG: 10 TABLET, FILM COATED ORAL at 09:46

## 2020-12-23 RX ADMIN — LURASIDONE HYDROCHLORIDE 60 MG: 60 TABLET, FILM COATED ORAL at 09:32

## 2020-12-23 RX ADMIN — POTASSIUM CHLORIDE 40 MEQ: 750 TABLET, FILM COATED, EXTENDED RELEASE ORAL at 09:32

## 2020-12-23 RX ADMIN — OXYCODONE HYDROCHLORIDE AND ACETAMINOPHEN 500 MG: 500 TABLET ORAL at 09:00

## 2020-12-23 RX ADMIN — PROPRANOLOL HYDROCHLORIDE 40 MG: 20 TABLET ORAL at 09:33

## 2020-12-23 RX ADMIN — BUPROPION HYDROCHLORIDE 100 MG: 100 TABLET, FILM COATED, EXTENDED RELEASE ORAL at 09:32

## 2020-12-23 NOTE — DISCHARGE SUMMARY
Discharge Summary       PATIENT ID: Jesica Dent  MRN: [id number]   YOB: 1970    DATE OF ADMISSION: 12/21/2020  3:30 PM    DATE OF DISCHARGE: 12/23/2020   PRIMARY CARE PROVIDER: Jaz Silva MD       DISCHARGING PROVIDER: Ulices Guo MD    To contact this individual call [phone] and ask the  to page. If unavailable ask to be transferred the Adult Hospitalist Department. CONSULTATIONS: IP CONSULT TO CARDIOLOGY  IP CONSULT TO HOSPITALIST  IP CONSULT TO PSYCHIATRY    PROCEDURES/SURGERIES: * No surgery found *    ADMITTING DIAGNOSES & HOSPITAL COURSE:   Lithium toxicity  Bipolar disorder      Per H&P  Sariahpilo Lassiter a 48 y.o. female past medical history significant for bipolar disorder, hypertension and essential tremor presented emergency room complaining of nausea, vomiting and diarrhea for almost a month.  Patient said initially symptoms were mild but in the last week has been progressively getting worse.  She denies any abdominal pain, cough, chest pain, shortness of breath, fever, constipation or urinary symptoms.  Work-up in the emergency room showed an elevated lithium level of 2.5. Patient was admitted to the hospital, evaluated with serial lithium levels. Toxicology, and [facility] were consulted. Her lithium level decreased appropriately. Poison control has signed off the case. Psychiatry was consulted, who recommended holding the lithium during admission. Patient has already contacted her outpatient psychiatrist, who recommended follow-up on Monday. She recommended to continue to hold lithium as well. On admission, patient had abnormal EKG, cardiology was consulted and recommended no further workup as pt was chest pain free, and had changes on her EKG prior to admission. On 12/23 patient was stable, eating and drinking well. Her lower extremity tremors have resolved. Stable for discharge.     DISCHARGE DIAGNOSES / PLAN:      Lithium toxicity - moderate symptoms (N/V/trmors)  - s/p IV fluids   - No indication for HD  - Poison control contacted, and have now singed off  - Telemetry monitoring  -Hold lithium on discharge, continue all  outpatient psychiatric medications      Bipolar disorder  - Resume home medications. Holding lithium as above  - Holding adderall and vyvanse. - Psychiatry consulted, recommend to continue holding lithium.    - Follow-up with psychiatry on Monday     Abnormal EKG - appears chronic.   - Cardiology consulted, no further workup   - Continue telemetry monitoring     Hypokalemia - replete and monitor      Essential tremor - resume home propanolol     Nicotine dependence - cessation discussed. Elevated TSH - will need repeat testing in 4-6 weeks by PCP     ADDITIONAL CARE RECOMMENDATIONS:   - Please take all medications as prescribed. Note changes as below. **Hold your lithium   - Please make sure to follow up with your primary care physician within 1-2 weeks of discharge for hospital follow up. You also need to follow up with your psychiatrist on Monday. - Please make sure to continue to monitor for: Worsening tremors, difficulty peeing, increased nausea and vomiting and return to the Emergency Department with any of these symptoms:   - Please get up slowly from a seated or laying position, avoid falls. - Avoid tobacco, alcohol and other illicit drug use.    - Please note that you should use the following DME: Rollator        PENDING TEST RESULTS:   At the time of discharge the following test results are still pending: None    FOLLOW UP APPOINTMENTS:    Follow-up Information     Follow up With Specialties Details Why Contact Info    Unknown MD Anast[facility]  [phone]      with your psychiatrist.                  DIET: Resume previous diet    ACTIVITY: Activity as tolerated    WOUND CARE: None    EQUIPMENT needed: Rollator      DISCHARGE MEDICATIONS:  Current Discharge Medication List      CONTINUE these medications which have NOT CHANGED    Details   propranoloL (INDERAL) 40 mg tablet Take 40 mg by mouth daily. propranolol LA (INDERAL LA) 160 mg capsule Take 160 mg by mouth nightly. lisdexamfetamine (Vyvanse) 50 mg cap Take 50 mg by mouth daily. cyanocobalamin (VITAMIN B12) 1,000 mcg/mL injection INJECT 1ML INTRAMUSCULARLY TWICE A WEEK. omeprazole (PRILOSEC) 40 mg capsule TAKE 1 CAPSULE BY MOUTH EVERY DAY      atorvastatin (LIPITOR) 10 mg tablet Take 10 mg by mouth daily. Rexulti 1 mg tab tablet TAKE 1 TABLET BY MOUTH AT BEDTIME      eszopiclone (LUNESTA) 3 mg tablet Take 3 mg by mouth nightly as needed. hydroCHLOROthiazide (HYDRODIURIL) 25 mg tablet       Latuda 60 mg tab tablet Take 60 mg by mouth nightly. dextroamphetamine-amphetamine (ADDERALL) 20 mg tablet Take 20 mg by mouth two (2) times a day. Morning and 3 PM      primidone (MYSOLINE) 50 mg tablet Take 1 Tab by mouth nightly. Indications: essential tremor  Qty: 30 Tab, Refills: 5      albuterol (PROVENTIL HFA, VENTOLIN HFA, PROAIR HFA) 90 mcg/actuation inhaler Take 2 Puffs by inhalation every four (4) hours as needed for Wheezing. Qty: 1 Inhaler, Refills: 0      ascorbic acid, vitamin C, (Vitamin C) 500 mg tablet Take 1 Tab by mouth two (2) times a day. Qty: 20 Tab, Refills: 0      zinc sulfate 220 mg tablet Take 1 Tab by mouth daily. Qty: 10 Tab, Refills: 0      buPROPion SR (WELLBUTRIN SR) 100 mg SR tablet Take 100 mg by mouth two (2) times a day. Indications: depression      QUEtiapine (SEROQUEL) 400 mg tablet Take 800 mg by mouth nightly. Indications: DEPRESSION TREATMENT ADJUNCT      topiramate (Topamax) 50 mg tablet Take 50 mg by mouth nightly.  Indications: alcoholism         STOP taking these medications       lithium 300 mg tablet Comments:   Reason for Stopping:         amphetamine-dextroamphetamine (ADDERALL) 30 mg tablet Comments:   Reason for Stopping:                 NOTIFY YOUR PHYSICIAN FOR ANY OF THE FOLLOWING:   Fever over 101 degrees for 24 hours. Chest pain, shortness of breath, fever, chills, nausea, vomiting, diarrhea, change in mentation, falling, weakness, bleeding. Severe pain or pain not relieved by medications. Or, any other signs or symptoms that you may have questions about. DISPOSITION:   XX Home With:   OT  PT  HH  RN       Long term SNF/Inpatient Rehab    Independent/assisted living    Hospice    Other:       PATIENT CONDITION AT DISCHARGE:     Functional status    Poor     Deconditioned    X Independent      Cognition    X Lucid     Forgetful     Dementia      Catheters/lines (plus indication)    Choi     PICC     PEG    X None      Code status    X Full code     DNR      PHYSICAL EXAMINATION AT DISCHARGE:  Visit Vitals  BP (!) 113/55   Pulse (!) 54   Temp 98.9 °F (37.2 °C)   Resp 15   Ht 5' 6\" (1.676 m)   Wt 119 kg (262 lb 5.6 oz)   LMP 12/18/2020   SpO2 93%   BMI 42.34 kg/m²     Gen: NAD, awake in bed  HEENT: NC/AT, sclera anicteric, PERRL, EOMI  CV: RRR no m/r/g, normal S1 and S2, no pedal edema   Resp: CTA b/l no increased work of breathing, no wheezing or rhonchi, speaking in full sentences   Abd: NT/ND, normal bowel sounds, no rebound or guarding  Ext: 2+ pulses, no edema  Neuro: minimal upper extremity tremors, no lower extremity tremors, CN2-12 grossly intact.    Skin: No rashes or lesions        CHRONIC MEDICAL DIAGNOSES:  Problem List as of 12/23/2020 Date Reviewed: 8/18/2020          Codes Class Noted - Resolved    Lithium toxicity ICD-10-CM: O23.537Q  ICD-9-CM: 985.8, E980.9  12/21/2020 - Present        Syncope and collapse ICD-10-CM: R55  ICD-9-CM: 780.2  6/5/2012 - Present              Greater than 30 minutes were spent with the patient on counseling and coordination of care    Signed:   Faustino Wolf MD  12/23/2020  9:01 AM

## 2020-12-23 NOTE — PROGRESS NOTES
Physician Progress Note      Shelby Jade  [facility] #:                  [id number]  :                       1970  ADMIT DATE:       2020 3:30 PM  [address] DATE:        2020 12:28 PM  RESPONDING  PROVIDER #:        Talya Cohn MD          QUERY TEXT:    Dear Attending,    Patient admitted with BMI 41.7. If possible, please document in progress notes and discharge summary if you are evaluating and /or treating any of the following: The medical record reflects the following:  Risk Factors: elevated BMI  Clinical Indicators: BMI: 41.7 with Ht: 5' 6\" and Wt: 117.2 kg  Treatment: monitoring      Thank you,    Perlita Chopra RN  [facility]  [phone]  Options provided:  -- Obesity  -- BMI not clinically significant  -- Other - I will add my own diagnosis  -- Disagree - Not applicable / Not valid  -- Disagree - Clinically unable to determine / Unknown  -- Refer to Clinical Documentation Reviewer    PROVIDER RESPONSE TEXT:    This patient has obesity.     Query created by: Violet Asher on 2020 11:24 AM      Electronically signed by:  Talya Cohn MD 2020 1:38 PM

## 2020-12-23 NOTE — PROGRESS NOTES
Bedside and Verbal shift change report given to [address] [facility],Johnathan 100 (oncoming nurse) by Luisa Hayward (offgoing nurse). Report included the following information SBAR, Kardex and Recent Results.

## 2020-12-23 NOTE — PROGRESS NOTES
Problem: Falls - Risk of  Goal: *Absence of Falls  Description: Document Shan Markham Fall Risk and appropriate interventions in the flowsheet. Outcome: Progressing Towards Goal  Note: Fall Risk Interventions:            Medication Interventions: Assess postural VS orthostatic hypotension, Patient to call before getting OOB, Teach patient to arise slowly    Elimination Interventions: Call light in reach, Patient to call for help with toileting needs, Stay With Me (per policy)    History of Falls Interventions:  Investigate reason for fall, Room close to nurse's station

## 2020-12-23 NOTE — PROGRESS NOTES
2330 Received BSR, patient refused continuous fluids. Confirmed with patient that she does not want fluids running during the night so that she may get some sleep. Bedside shift change report given to BLACK Gomez (oncoming nurse) by Ermias Larios RN (offgoing nurse). Report included the following information SBAR and Cardiac Rhythm nsr.

## 2020-12-23 NOTE — ROUTINE PROCESS
Attempted to schedule PATRICIA PCP appt with Dr. Waldo Hernandez,  requested that the patient call and schedule own appt.

## 2020-12-23 NOTE — PROGRESS NOTES
Problem: Self Care Deficits Care Plan (Adult)  Goal: *Acute Goals and Plan of Care (Insert Text)  Description:   FUNCTIONAL STATUS PRIOR TO ADMISSION: Patient was independent and active without use of DME. Patient was modified independent for basic and instrumental ADLs. Pt reports having walker and SPC available at home and has built in bench in her shower. Pt is on disability and does not work. HOME SUPPORT: The patient lived with  but did not require assist.    Occupational Therapy Goals  Initiated 12/23/2020  1. Patient will perform self-feeding, standing at sink for at least 5 minutes, with modified independence within 7 day(s). 2.  Patient will perform lower body dressing with modified independence within 7 day(s). 3.  Patient will perform bathing with supervision/set-up within 7 day(s). 4.  Patient will perform toilet transfers with modified independence within 7 day(s). 5.  Patient will perform all aspects of toileting with modified independence within 7 day(s). 6.  Patient will participate in upper extremity therapeutic exercise/activities with independence for 10 minutes within 7 day(s). 7.  Patient will utilize energy conservation techniques during functional activities with verbal cues within 7 day(s). Outcome: Progressing Towards Goal     OCCUPATIONAL THERAPY EVALUATION  Patient: Zeyad Clark (54 y.o. female)  Date: 12/23/2020  Primary Diagnosis: Lithium toxicity [T56.891A]        Precautions:  Fall    ASSESSMENT  Based on the objective data described below, the patient presents with impaired higher level balance and decreased activity tolerance following admission for lithium toxicity. Pt is received in bed, pleasant and agreeable to participate with OT. She performs serial ADL tasks both seated EOB and standing in bathroom with up to CGA.  She requires up to min A for distal LB dressing tasks but reports good compensatory strategies and set up at home to increase independence. Noted some instability in standing but improved with use of RW which pt reports she has at home. Pt is receptive to education regarding ADL adaptations and energy conservation in prep for transition home. Pt reporting she may purchase rollator in the future for community mobility. Discussed with CM. Current Level of Function Impacting Discharge (ADLs/self-care): overall CGA for mobility; up to min A for LB ADLs; mod I to SBA for UB ADLs    Functional Outcome Measure: The patient scored Total: 65/100 on the Barthel Index outcome measure which is indicative of 35% impaired ability to care for basic self needs/dependency on others. Other factors to consider for discharge: lives with ; has DME     Patient will benefit from skilled therapy intervention to address the above noted impairments. PLAN :  Recommendations and Planned Interventions: self care training, functional mobility training, therapeutic exercise, balance training, therapeutic activities, endurance activities, patient education, home safety training and family training/education    Frequency/Duration: Patient will be followed by occupational therapy 3 times a week to address goals. Recommendation for discharge: (in order for the patient to meet his/her long term goals)  No skilled occupational therapy/ follow up rehabilitation needs identified at this time. This discharge recommendation:  Has been made in collaboration with the attending provider and/or case management    IF patient discharges home will need the following DME: none for OT       SUBJECTIVE:   Patient stated I have my  at home with me.     OBJECTIVE DATA SUMMARY:   HISTORY:   Past Medical History:   Diagnosis Date    Alcoholic ([address]     High cholesterol     Hypertension     Psychiatric disorder     Bipolar     Past Surgical History:   Procedure Laterality Date    HX GYN      D&C       Expanded or extensive additional review of patient history:     Home Situation  Home Environment: Private residence  # Steps to Enter: (has stairs but uses ramp)  Wheelchair Ramp: Yes  One/Two Story Residence: One story  Living Alone: No  Support Systems: Spouse/Significant Other/Partner  Patient Expects to be Discharged to[de-identified] Private residence  Current DME Used/Available at Home: Tea Mcmillan, straight, Walker, rolling  Tub or Shower Type: Shower(with built in bench)    Hand dominance: Right    EXAMINATION OF PERFORMANCE DEFICITS:  Cognitive/Behavioral Status:  Neurologic State: Alert  Orientation Level: Oriented X4  Cognition: Appropriate decision making; Follows commands  Perception: Appears intact  Perseveration: No perseveration noted  Safety/Judgement: Awareness of environment; Fall prevention    Hearing: Auditory  Auditory Impairment: None    Vision/Perceptual:    Tracking: Able to track stimulus in all quadrants w/o difficulty    Diplopia: No    Acuity: Within Defined Limits      Range of Motion:  AROM: Generally decreased, functional    Strength:  Strength: Generally decreased, functional    Coordination:  Coordination: Generally decreased, functional(mild UE tremors noted)  Fine Motor Skills-Upper: Left Intact; Right Intact    Gross Motor Skills-Upper: Left Intact; Right Intact    Tone:  Tone: Normal    Balance:  Sitting: Intact  Standing: With support  Standing - Static: Good  Standing - Dynamic : Good;Fair    Functional Mobility and Transfers for ADLs:  Bed Mobility:  Supine to Sit: (pt recieved sitting EOB)  Scooting: Independent    Transfers:  Sit to Stand: Stand-by assistance;Contact guard assistance  Stand to Sit: Stand-by assistance  Bed to Chair: Contact guard assistance(with rolling walker)  Bathroom Mobility: Stand-by assistance;Contact guard assistance  Toilet Transfer : Stand-by assistance    ADL Assessment:  Feeding: Independent    Oral Facial Hygiene/Grooming: Stand-by assistance(for standing at sink)    Bathing: Contact guard assistance;Minimum assistance(infer based on observations)    Upper Body Dressing: Setup    Lower Body Dressing: Contact guard assistance    Toileting: Stand by assistance;Contact guard assistance    ADL Intervention and task modifications:    Grooming  Grooming Assistance: Stand-by assistance  Position Performed: Standing(at sink with rolling walker)  Washing Hands: Stand-by assistance    Upper Body Dressing Assistance  Bra: Set-up  Pullover Shirt: Set-up    Lower Body Dressing Assistance  Slip on Shoes with Back: Supervision  Leg Crossed Method Used: No  Position Performed: Seated edge of bed    Toileting  Bladder Hygiene: Modified independent(seated)  Clothing Management: Contact guard assistance    Cognitive Retraining  Safety/Judgement: Awareness of environment; Fall prevention    Patient instructed and indicated understanding energy conservation techniques to increase independence and safety during ADLs. Functional Measure:  Barthel Index:    Bathin  Bladder: 10  Bowels: 10  Groomin  Dressin  Feeding: 10  Mobility: 10  Stairs: 0  Toilet Use: 5  Transfer (Bed to Chair and Back): 10  Total: 65/100        The Barthel ADL Index: Guidelines  1. The index should be used as a record of what a patient does, not as a record of what a patient could do. 2. The main aim is to establish degree of independence from any help, physical or verbal, however minor and for whatever reason. 3. The need for supervision renders the patient not independent. 4. A patient's performance should be established using the best available evidence. Asking the patient, friends/relatives and nurses are the usual sources, but direct observation and common sense are also important. However direct testing is not needed. 5. Usually the patient's performance over the preceding 24-48 hours is important, but occasionally longer periods will be relevant. 6. Middle categories imply that the patient supplies over 50 per cent of the effort.   7. Use of aids to be independent is allowed. Sunita Luque., Barthel, D.W. (5810). Functional evaluation: the Barthel Index. [address] (14)2. LACIE Heredia Lott Rolls.Yudy., [place], [address] (1999). Measuring the change indisability after inpatient rehabilitation; comparison of the responsiveness of the Barthel Index and Functional [place] Measure. Journal of Neurology, Neurosurgery, and Psychiatry, 66(4), 450-751. MALINDA Spring, ELIANE Dunne, & Jayna Lopez M.A. (2004.) Assessment of post-stroke quality of life in cost-effectiveness studies: The usefulness of the Barthel Index and the EuroQoL-5D. Quality of Life Research, 15, 758-87     Occupational Therapy Evaluation Charge Determination   History Examination Decision-Making   LOW Complexity : Brief history review  LOW Complexity : 1-3 performance deficits relating to physical, cognitive , or psychosocial skils that result in activity limitations and / or participation restrictions  LOW Complexity : No comorbidities that affect functional and no verbal or physical assistance needed to complete eval tasks       Based on the above components, the patient evaluation is determined to be of the following complexity level: LOW   Pain Rating:  Pt reporting minimal pain    Activity Tolerance:   Good and SpO2 stable on RA    After treatment patient left in no apparent distress:    Sitting in chair and Call bell within reach    COMMUNICATION/EDUCATION:   The patients plan of care was discussed with: Physical therapist and Registered nurse. Home safety education was provided and the patient/caregiver indicated understanding., Patient/family have participated as able in goal setting and plan of care. and Patient/family agree to work toward stated goals and plan of care. This patients plan of care is appropriate for delegation to [facility].     Thank you for this referral.  Jaki Sawyer OT  Time Calculation: 27 mins

## 2020-12-23 NOTE — DISCHARGE INSTRUCTIONS
Dear Ryan Edmonds,    Thank you for choosing [address] [place] [address] for your healthcare needs. We strive to provide EXCELLENT care to you and your family. In an effort to explain clearly why you were here in the hospital, I've also written a very brief summary below. Other details including formal diagnosis, medication changes, and follow up appointment recommendations can also be found in this packet. You were admitted for nausea and vomiting due to lithium toxicity for which you were started on IV fluids, and your lithium was held. You also received care from specialist physicians in the following specialties:  [address] TO HOSPITALIST  IP CONSULT TO PSYCHIATRY    Here are the updates to your medication list:  **Hold your lithium     Remember that it is important for you to take your medications exactly as they are prescribed. It is helpful to keep a list of your medication with the names, dosages, and times to be taken in your wallet. Additionally,   - Please make sure to follow up with your primary care physician within 1-2 weeks of discharge for hospital follow up. You also need to follow up with your psychiatrist on Monday. - Please make sure to continue to monitor for: Worsening tremors, difficulty peeing, increased nausea and vomiting and return to the Emergency Department with any of these symptoms:   - Please get up slowly from a seated or laying position, avoid falls. - Avoid tobacco, alcohol and other illicit drug use. - Please note that you should use the following DME: Rollator    Make sure to also see your primary care doctor for follow-up. Bring these papers with you and be sure to review your medication list with your doctor. I cannot stress the importance of follow up enough. I've included the information for your follow-up appointments below:     Follow-up Information     Follow up With Specialties Details Why Contact Info    [place] Prom, Job Jackson, [address] [facility]  [phone]      with your psychiatrist.                 Should you have any fever over 101 degrees for 24 hours, chest pain, shortness of breath, fever, chills, nausea, vomiting, diarrhea, change in mentation, falling, weakness, bleeding, or worsening pain, please seek medical attention immediately. Finally, as your discharging physician, you may be receiving a survey regarding my care. I would greatly value and appreciate your input in the survey as we strive for excellence. If you have any questions, I can be reached at [phone].   Thank you so much again for allowing me to care for you at [address].    Respectfully yours,  Faustino Wolf MD

## 2020-12-23 NOTE — PROGRESS NOTES
PATRICIA;  crm followed up with patient and she does have a traditional walker at home. She was advised that she can purchase a rollator in the near future for use outside if she wishes to. She will use the walker she has instead when she goes outside and walks along the road. Patient declined any further services for home health to include nursing, PT and OT at this time. She has a follow up appt with her MD on Monday Dec 28th  Virtual appt. I have asked Gayatri Figueroa RN to follow up with patient's  Nurse Gisele Renee and Dr Haja Hodges.

## 2020-12-23 NOTE — ROUTINE PROCESS
Bedside RN performed patient education and medication education. Discharge concerns initiated and discussed with patient, including clarification on \"who\" assists the patient at their home and instructions for when the home going patient should call their provider after discharge. Opportunity for questions and clarification was provided. Patient receptive to education: YES Patient stated: Cele Smalls was my thyroid level? Barriers to Education: none Diagnosis Education given:  YES Length of stay: 2 Expected Day of Discharge: 2 Ask if they have \"Help at Home\" & add to white board? YES Education Day #: 2 Medication Education Given:  YES 
M in the box Medication name: Endy Sawyer Pt aware of [facility] survey: YES

## 2020-12-23 NOTE — PROGRESS NOTES
Occupational Therapy:  12/23/20    Orders received, chart reviewed and patient evaluated by occupational therapy. Pending progression with skilled acute occupational therapy, recommend:  No skilled occupational therapy/ follow up rehabilitation needs identified at this time. Recommend with nursing patient to complete as able in order to maintain strength, endurance and independence: OOB to chair 3x/day with 1 person assist and functional mobility to the bathroom with 1 person assist. Thank you for your assistance. Full evaluation to follow.      Thank you,  Tirso Khan OTR/L

## 2021-03-15 ENCOUNTER — HOSPITAL ENCOUNTER (EMERGENCY)
Age: 51
Discharge: ANOTHER HOSPITAL | End: 2021-03-16
Attending: EMERGENCY MEDICINE
Payer: MEDICARE

## 2021-03-15 DIAGNOSIS — R45.851 SUICIDAL IDEATION: Primary | ICD-10-CM

## 2021-03-15 DIAGNOSIS — N39.0 URINARY TRACT INFECTION WITHOUT HEMATURIA, SITE UNSPECIFIED: ICD-10-CM

## 2021-03-15 DIAGNOSIS — E87.6 HYPOKALEMIA: ICD-10-CM

## 2021-03-15 LAB
ALBUMIN SERPL-MCNC: 3.4 G/DL (ref 3.5–5)
ALBUMIN/GLOB SERPL: 1.1 {RATIO} (ref 1.1–2.2)
ALP SERPL-CCNC: 77 U/L (ref 45–117)
ALT SERPL-CCNC: 27 U/L (ref 12–78)
AMPHET UR QL SCN: POSITIVE
ANION GAP SERPL CALC-SCNC: 8 MMOL/L (ref 5–15)
AST SERPL-CCNC: 25 U/L (ref 15–37)
BARBITURATES UR QL SCN: NEGATIVE
BASOPHILS # BLD: 0.1 K/UL (ref 0–0.1)
BASOPHILS NFR BLD: 1 % (ref 0–1)
BENZODIAZ UR QL: POSITIVE
BILIRUB SERPL-MCNC: 0.3 MG/DL (ref 0.2–1)
BUN SERPL-MCNC: 8 MG/DL (ref 6–20)
BUN/CREAT SERPL: 9 (ref 12–20)
CALCIUM SERPL-MCNC: 9.3 MG/DL (ref 8.5–10.1)
CANNABINOIDS UR QL SCN: NEGATIVE
CHLORIDE SERPL-SCNC: 98 MMOL/L (ref 97–108)
CO2 SERPL-SCNC: 32 MMOL/L (ref 21–32)
COCAINE UR QL SCN: NEGATIVE
CREAT SERPL-MCNC: 0.87 MG/DL (ref 0.55–1.02)
DIFFERENTIAL METHOD BLD: NORMAL
DRUG SCRN COMMENT,DRGCM: ABNORMAL
EOSINOPHIL # BLD: 0.4 K/UL (ref 0–0.4)
EOSINOPHIL NFR BLD: 5 % (ref 0–7)
ERYTHROCYTE [DISTWIDTH] IN BLOOD BY AUTOMATED COUNT: 13.6 % (ref 11.5–14.5)
ETHANOL SERPL-MCNC: <10 MG/DL
GLOBULIN SER CALC-MCNC: 3.1 G/DL (ref 2–4)
GLUCOSE SERPL-MCNC: 108 MG/DL (ref 65–100)
HCT VFR BLD AUTO: 44.1 % (ref 35–47)
HGB BLD-MCNC: 14.4 G/DL (ref 11.5–16)
IMM GRANULOCYTES # BLD AUTO: 0 K/UL (ref 0–0.04)
IMM GRANULOCYTES NFR BLD AUTO: 0 % (ref 0–0.5)
LYMPHOCYTES # BLD: 3.5 K/UL (ref 0.8–3.5)
LYMPHOCYTES NFR BLD: 46 % (ref 12–49)
MAGNESIUM SERPL-MCNC: 1.9 MG/DL (ref 1.6–2.4)
MCH RBC QN AUTO: 30.4 PG (ref 26–34)
MCHC RBC AUTO-ENTMCNC: 32.7 G/DL (ref 30–36.5)
MCV RBC AUTO: 93.2 FL (ref 80–99)
METHADONE UR QL: NEGATIVE
MONOCYTES # BLD: 0.7 K/UL (ref 0–1)
MONOCYTES NFR BLD: 9 % (ref 5–13)
NEUTS SEG # BLD: 3 K/UL (ref 1.8–8)
NEUTS SEG NFR BLD: 39 % (ref 32–75)
NRBC # BLD: 0 K/UL (ref 0–0.01)
NRBC BLD-RTO: 0 PER 100 WBC
OPIATES UR QL: NEGATIVE
PCP UR QL: NEGATIVE
PLATELET # BLD AUTO: 181 K/UL (ref 150–400)
PMV BLD AUTO: 9.6 FL (ref 8.9–12.9)
POTASSIUM SERPL-SCNC: 2.6 MMOL/L (ref 3.5–5.1)
PROT SERPL-MCNC: 6.5 G/DL (ref 6.4–8.2)
RBC # BLD AUTO: 4.73 M/UL (ref 3.8–5.2)
SODIUM SERPL-SCNC: 138 MMOL/L (ref 136–145)
WBC # BLD AUTO: 7.6 K/UL (ref 3.6–11)

## 2021-03-15 PROCEDURE — 80307 DRUG TEST PRSMV CHEM ANLYZR: CPT

## 2021-03-15 PROCEDURE — 83735 ASSAY OF MAGNESIUM: CPT

## 2021-03-15 PROCEDURE — 87077 CULTURE AEROBIC IDENTIFY: CPT

## 2021-03-15 PROCEDURE — 81001 URINALYSIS AUTO W/SCOPE: CPT

## 2021-03-15 PROCEDURE — 87635 SARS-COV-2 COVID-19 AMP PRB: CPT

## 2021-03-15 PROCEDURE — 87186 SC STD MICRODIL/AGAR DIL: CPT

## 2021-03-15 PROCEDURE — 90791 PSYCH DIAGNOSTIC EVALUATION: CPT

## 2021-03-15 PROCEDURE — 80053 COMPREHEN METABOLIC PANEL: CPT

## 2021-03-15 PROCEDURE — 82077 ASSAY SPEC XCP UR&BREATH IA: CPT

## 2021-03-15 PROCEDURE — 87086 URINE CULTURE/COLONY COUNT: CPT

## 2021-03-15 PROCEDURE — 85025 COMPLETE CBC W/AUTO DIFF WBC: CPT

## 2021-03-15 PROCEDURE — 74011250637 HC RX REV CODE- 250/637: Performed by: EMERGENCY MEDICINE

## 2021-03-15 PROCEDURE — 36415 COLL VENOUS BLD VENIPUNCTURE: CPT

## 2021-03-15 PROCEDURE — U0005 INFEC AGEN DETEC AMPLI PROBE: HCPCS

## 2021-03-15 PROCEDURE — 99284 EMERGENCY DEPT VISIT MOD MDM: CPT

## 2021-03-15 RX ORDER — POTASSIUM CHLORIDE 750 MG/1
40 TABLET, FILM COATED, EXTENDED RELEASE ORAL
Status: COMPLETED | OUTPATIENT
Start: 2021-03-15 | End: 2021-03-15

## 2021-03-15 RX ADMIN — POTASSIUM CHLORIDE 40 MEQ: 750 TABLET, EXTENDED RELEASE ORAL at 23:21

## 2021-03-16 ENCOUNTER — HOSPITAL ENCOUNTER (INPATIENT)
Age: 51
LOS: 3 days | Discharge: HOME | DRG: 885 | End: 2021-03-19
Attending: PSYCHIATRY & NEUROLOGY | Admitting: PSYCHIATRY & NEUROLOGY
Payer: MEDICARE

## 2021-03-16 VITALS
RESPIRATION RATE: 18 BRPM | HEART RATE: 80 BPM | BODY MASS INDEX: 42.34 KG/M2 | SYSTOLIC BLOOD PRESSURE: 124 MMHG | WEIGHT: 262.35 LBS | TEMPERATURE: 98 F | OXYGEN SATURATION: 98 % | DIASTOLIC BLOOD PRESSURE: 55 MMHG

## 2021-03-16 PROBLEM — F31.9 BIPOLAR DISORDER (HCC): Status: ACTIVE | Noted: 2021-03-16

## 2021-03-16 LAB
ANION GAP BLD CALC-SCNC: 16 MMOL/L (ref 10–20)
APPEARANCE UR: CLEAR
BACTERIA URNS QL MICRO: ABNORMAL /HPF
BILIRUB UR QL: NEGATIVE
BUN BLD-MCNC: 7 MG/DL (ref 9–20)
CA-I BLD-MCNC: 1.29 MMOL/L (ref 1.12–1.32)
CHLORIDE BLD-SCNC: 93 MMOL/L (ref 98–107)
CO2 BLD-SCNC: 34 MMOL/L (ref 21–32)
COLOR UR: ABNORMAL
COVID-19 RAPID TEST, COVR: NOT DETECTED
CREAT BLD-MCNC: 1 MG/DL (ref 0.6–1.3)
EPITH CASTS URNS QL MICRO: ABNORMAL /LPF
GLUCOSE BLD-MCNC: 116 MG/DL (ref 65–100)
GLUCOSE UR STRIP.AUTO-MCNC: NEGATIVE MG/DL
HCT VFR BLD CALC: 47 % (ref 35–47)
HGB UR QL STRIP: NEGATIVE
KETONES UR QL STRIP.AUTO: NEGATIVE MG/DL
LEUKOCYTE ESTERASE UR QL STRIP.AUTO: ABNORMAL
NITRITE UR QL STRIP.AUTO: POSITIVE
PH UR STRIP: 6 [PH] (ref 5–8)
POTASSIUM BLD-SCNC: 3.1 MMOL/L (ref 3.5–5.1)
PROT UR STRIP-MCNC: NEGATIVE MG/DL
RBC #/AREA URNS HPF: ABNORMAL /HPF (ref 0–5)
SARS-COV-2, COV2: NORMAL
SARS-COV-2, COV2: NOT DETECTED
SERVICE CMNT-IMP: ABNORMAL
SODIUM BLD-SCNC: 139 MMOL/L (ref 136–145)
SOURCE, COVRS: NORMAL
SP GR UR REFRACTOMETRY: 1.01 (ref 1–1.03)
SPECIMEN SOURCE, FCOV2M: NORMAL
UA: UC IF INDICATED,UAUC: ABNORMAL
UROBILINOGEN UR QL STRIP.AUTO: 0.2 EU/DL (ref 0.2–1)
WBC URNS QL MICRO: ABNORMAL /HPF (ref 0–4)

## 2021-03-16 PROCEDURE — 74011250637 HC RX REV CODE- 250/637: Performed by: PSYCHIATRY & NEUROLOGY

## 2021-03-16 PROCEDURE — 74011250637 HC RX REV CODE- 250/637: Performed by: EMERGENCY MEDICINE

## 2021-03-16 PROCEDURE — U0005 INFEC AGEN DETEC AMPLI PROBE: HCPCS

## 2021-03-16 PROCEDURE — 65220000003 HC RM SEMIPRIVATE PSYCH

## 2021-03-16 PROCEDURE — 80047 BASIC METABLC PNL IONIZED CA: CPT

## 2021-03-16 PROCEDURE — 74011250637 HC RX REV CODE- 250/637: Performed by: NURSE PRACTITIONER

## 2021-03-16 RX ORDER — PRIMIDONE 50 MG/1
50 TABLET ORAL
Status: DISCONTINUED | OUTPATIENT
Start: 2021-03-16 | End: 2021-03-16

## 2021-03-16 RX ORDER — DIPHENHYDRAMINE HYDROCHLORIDE 50 MG/ML
50 INJECTION, SOLUTION INTRAMUSCULAR; INTRAVENOUS
Status: DISCONTINUED | OUTPATIENT
Start: 2021-03-16 | End: 2021-03-19 | Stop reason: HOSPADM

## 2021-03-16 RX ORDER — ALBUTEROL SULFATE 90 UG/1
2 AEROSOL, METERED RESPIRATORY (INHALATION)
Status: DISCONTINUED | OUTPATIENT
Start: 2021-03-16 | End: 2021-03-19 | Stop reason: HOSPADM

## 2021-03-16 RX ORDER — HALOPERIDOL 5 MG/ML
5 INJECTION INTRAMUSCULAR
Status: DISCONTINUED | OUTPATIENT
Start: 2021-03-16 | End: 2021-03-19 | Stop reason: HOSPADM

## 2021-03-16 RX ORDER — QUETIAPINE FUMARATE 100 MG/1
100 TABLET, FILM COATED ORAL
Status: DISCONTINUED | OUTPATIENT
Start: 2021-03-16 | End: 2021-03-17

## 2021-03-16 RX ORDER — TOPIRAMATE 25 MG/1
50 TABLET ORAL
Status: DISCONTINUED | OUTPATIENT
Start: 2021-03-16 | End: 2021-03-19 | Stop reason: HOSPADM

## 2021-03-16 RX ORDER — METFORMIN HYDROCHLORIDE 500 MG/1
500 TABLET ORAL 2 TIMES DAILY WITH MEALS
Status: DISCONTINUED | OUTPATIENT
Start: 2021-03-16 | End: 2021-03-19 | Stop reason: HOSPADM

## 2021-03-16 RX ORDER — TRAZODONE HYDROCHLORIDE 50 MG/1
50 TABLET ORAL
Status: DISCONTINUED | OUTPATIENT
Start: 2021-03-16 | End: 2021-03-19 | Stop reason: HOSPADM

## 2021-03-16 RX ORDER — IBUPROFEN 200 MG
1 TABLET ORAL DAILY
Status: DISCONTINUED | OUTPATIENT
Start: 2021-03-16 | End: 2021-03-19 | Stop reason: HOSPADM

## 2021-03-16 RX ORDER — DIPHENHYDRAMINE HCL 25 MG
50 CAPSULE ORAL
Status: DISCONTINUED | OUTPATIENT
Start: 2021-03-16 | End: 2021-03-19 | Stop reason: HOSPADM

## 2021-03-16 RX ORDER — PANTOPRAZOLE SODIUM 40 MG/1
40 TABLET, DELAYED RELEASE ORAL
Status: DISCONTINUED | OUTPATIENT
Start: 2021-03-17 | End: 2021-03-19 | Stop reason: HOSPADM

## 2021-03-16 RX ORDER — BUPROPION HYDROCHLORIDE 150 MG/1
150 TABLET, EXTENDED RELEASE ORAL DAILY
COMMUNITY

## 2021-03-16 RX ORDER — LITHIUM CARBONATE 300 MG/1
300 CAPSULE ORAL 2 TIMES DAILY
Status: DISCONTINUED | OUTPATIENT
Start: 2021-03-16 | End: 2021-03-19 | Stop reason: HOSPADM

## 2021-03-16 RX ORDER — ERGOCALCIFEROL 1.25 MG/1
50000 CAPSULE ORAL
COMMUNITY

## 2021-03-16 RX ORDER — DIAZEPAM 10 MG/1
10 TABLET ORAL
COMMUNITY
End: 2021-03-19

## 2021-03-16 RX ORDER — QUETIAPINE FUMARATE 100 MG/1
100 TABLET, FILM COATED ORAL
COMMUNITY
End: 2021-03-19

## 2021-03-16 RX ORDER — UREA 10 %
220 LOTION (ML) TOPICAL DAILY
Status: DISCONTINUED | OUTPATIENT
Start: 2021-03-17 | End: 2021-03-16

## 2021-03-16 RX ORDER — ATORVASTATIN CALCIUM 10 MG/1
10 TABLET, FILM COATED ORAL DAILY
Status: DISCONTINUED | OUTPATIENT
Start: 2021-03-17 | End: 2021-03-19 | Stop reason: HOSPADM

## 2021-03-16 RX ORDER — CEPHALEXIN 250 MG/1
500 CAPSULE ORAL
Status: COMPLETED | OUTPATIENT
Start: 2021-03-16 | End: 2021-03-16

## 2021-03-16 RX ORDER — LITHIUM CARBONATE 300 MG
300 TABLET ORAL 2 TIMES DAILY
Status: DISCONTINUED | OUTPATIENT
Start: 2021-03-16 | End: 2021-03-16 | Stop reason: CLARIF

## 2021-03-16 RX ORDER — AMLODIPINE BESYLATE 5 MG/1
2.5 TABLET ORAL DAILY
Status: DISCONTINUED | OUTPATIENT
Start: 2021-03-17 | End: 2021-03-19 | Stop reason: HOSPADM

## 2021-03-16 RX ORDER — PROPRANOLOL HYDROCHLORIDE 80 MG/1
160 CAPSULE, EXTENDED RELEASE ORAL
Status: DISCONTINUED | OUTPATIENT
Start: 2021-03-16 | End: 2021-03-19 | Stop reason: HOSPADM

## 2021-03-16 RX ORDER — ADHESIVE BANDAGE
30 BANDAGE TOPICAL DAILY PRN
Status: DISCONTINUED | OUTPATIENT
Start: 2021-03-16 | End: 2021-03-19 | Stop reason: HOSPADM

## 2021-03-16 RX ORDER — BENZTROPINE MESYLATE 1 MG/1
1 TABLET ORAL
Status: DISCONTINUED | OUTPATIENT
Start: 2021-03-16 | End: 2021-03-19 | Stop reason: HOSPADM

## 2021-03-16 RX ORDER — OLANZAPINE 5 MG/1
5 TABLET ORAL
Status: DISCONTINUED | OUTPATIENT
Start: 2021-03-16 | End: 2021-03-19 | Stop reason: HOSPADM

## 2021-03-16 RX ORDER — LORAZEPAM 2 MG/ML
1 INJECTION INTRAMUSCULAR
Status: DISCONTINUED | OUTPATIENT
Start: 2021-03-16 | End: 2021-03-19 | Stop reason: HOSPADM

## 2021-03-16 RX ORDER — PROPRANOLOL HYDROCHLORIDE 10 MG/1
40 TABLET ORAL DAILY
Status: DISCONTINUED | OUTPATIENT
Start: 2021-03-17 | End: 2021-03-16

## 2021-03-16 RX ORDER — ALBUTEROL SULFATE 0.83 MG/ML
2.5 SOLUTION RESPIRATORY (INHALATION)
COMMUNITY

## 2021-03-16 RX ORDER — HYDROXYZINE 25 MG/1
50 TABLET, FILM COATED ORAL
Status: DISCONTINUED | OUTPATIENT
Start: 2021-03-16 | End: 2021-03-19 | Stop reason: HOSPADM

## 2021-03-16 RX ORDER — POTASSIUM CHLORIDE 750 MG/1
40 TABLET, FILM COATED, EXTENDED RELEASE ORAL
Status: COMPLETED | OUTPATIENT
Start: 2021-03-16 | End: 2021-03-16

## 2021-03-16 RX ORDER — ACETAMINOPHEN 325 MG/1
650 TABLET ORAL
Status: DISCONTINUED | OUTPATIENT
Start: 2021-03-16 | End: 2021-03-19 | Stop reason: HOSPADM

## 2021-03-16 RX ORDER — CEPHALEXIN 250 MG/1
500 CAPSULE ORAL 4 TIMES DAILY
Status: DISCONTINUED | OUTPATIENT
Start: 2021-03-16 | End: 2021-03-19 | Stop reason: HOSPADM

## 2021-03-16 RX ADMIN — LITHIUM CARBONATE 300 MG: 300 CAPSULE, GELATIN COATED ORAL at 18:02

## 2021-03-16 RX ADMIN — CEPHALEXIN 500 MG: 250 CAPSULE ORAL at 08:00

## 2021-03-16 RX ADMIN — QUETIAPINE FUMARATE 100 MG: 100 TABLET ORAL at 21:12

## 2021-03-16 RX ADMIN — TOPIRAMATE 50 MG: 25 TABLET, FILM COATED ORAL at 21:12

## 2021-03-16 RX ADMIN — HYDROXYZINE HYDROCHLORIDE 50 MG: 25 TABLET, FILM COATED ORAL at 21:15

## 2021-03-16 RX ADMIN — MAGNESIUM HYDROXIDE 30 ML: 2400 SUSPENSION ORAL at 21:18

## 2021-03-16 RX ADMIN — LURASIDONE HYDROCHLORIDE 80 MG: 80 TABLET, FILM COATED ORAL at 18:02

## 2021-03-16 RX ADMIN — CEPHALEXIN 500 MG: 250 CAPSULE ORAL at 21:12

## 2021-03-16 RX ADMIN — PROPRANOLOL HYDROCHLORIDE 160 MG: 80 CAPSULE, EXTENDED RELEASE ORAL at 21:12

## 2021-03-16 RX ADMIN — CEPHALEXIN 500 MG: 250 CAPSULE ORAL at 18:02

## 2021-03-16 RX ADMIN — METFORMIN HYDROCHLORIDE 500 MG: 500 TABLET ORAL at 18:02

## 2021-03-16 RX ADMIN — POTASSIUM CHLORIDE 40 MEQ: 750 TABLET, EXTENDED RELEASE ORAL at 15:53

## 2021-03-16 RX ADMIN — DIPHENHYDRAMINE HYDROCHLORIDE 50 MG: 25 CAPSULE ORAL at 21:17

## 2021-03-16 NOTE — BH NOTES
Pt was transferred from [facility] room. Per report she was having mood swings, increased malika and endorses SI with plan. Recently had her medication changed and states that she has been struggling with her mental health since she was taken off of Lithium. At bedside, patient reported being suicidal over the past month but reported her symptoms have increased. Patient reported current suicidal thoughts as reported her first plan was to overdose so she gave her  her medications to hold for her. As reported today she had a plan to wait until he was sleep and drive car into tree. UDS positive benzo's and amphetamines. BAL<10. NKA. Per report pt has a UTI and was given keflex in the ER. Pt was calm and cooperative on admission to unit. A&Ox4. Pt denies si/hi/a/v craig. CSSRS completed and suicide precautions was discontinued per MD orders. MD met with pt for treatment team. Pt skin intact. Pt states she has hx of urinary incontinence, hyperlipidemia, pre diabetes, obesity. Pt is visible on the unit using phone at this time.

## 2021-03-16 NOTE — ED NOTES
/TRANSFER - OUT REPORT:    Verbal report given to Katie(name) on Maye Loveless  being transferred to [facility](unit) for routine progression of care       Report consisted of patients Situation, Background, Assessment and   Recommendations(SBAR). Information from the following report(s) Kardex, ED Summary and Intake/Output was reviewed with the receiving nurse. Lines:       Opportunity for questions and clarification was provided.       Patient transported with:   "[employer]
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
## 2021-03-16 NOTE — GROUP NOTE
SINA  GROUP DOCUMENTATION INDIVIDUAL Group Therapy Note Date: 3/16/2021 Group Start Time: 1400 Group End Time: 1500 Group Topic: Recreational/Music Therapy [facility] - [place] [id number] ACUTE BEHAV [facility] [place], [address] GROUP DOCUMENTATION GROUP Group Therapy Note Attendees: 4 Attendance: Did not attend Patient's Goal: Interventions/techniques: 
Tono Saunders

## 2021-03-16 NOTE — GROUP NOTE
SINA  GROUP DOCUMENTATION INDIVIDUAL Group Therapy Note Date: 3/16/2021 Group Start Time: 1000 Group End Time: 1100 Group Topic: Topic Group [facility] - [place] [address] ACUTE BE[address] [facility] [place], [address] [facility] GROUP DOCUMENTATION GROUP Group Therapy Note Attendees: 7 Attendance: Did not attend Patient's Goal: Interventions/techniques Precious Sultana

## 2021-03-16 NOTE — PROGRESS NOTES
[facility] Medication Reconciliation     Information obtained from: Patient's medication list, [facility] pharmacy ([phone]), [facility]  RxQuery data available1: Yes    Comments/recommendations:  Recent prescription filled yesterday for lamotrigine 25 mg PO daily - patient reports not starting this yet therefore did not add to PTA list.   Most recently filled Vyvanse 70 mg capsules but patient reports taking 50 mg capsules. Reports no longer taking dextroamphetamine-amphetamine (last filled 2021)  Drug-drug interaction identified: primidone is a strong CY inducer that induces metabolism of lurasidone & quetiapine which reduces their efficacy. Concomitant therapy with lurasidone and primidone is contraindicated. Medication changes (since last review): Added  Albuterol nebulizer solution  Diazepam  Ergocalciferol  Ferrous sulfate  Quetiapine   Removed  Ascorbic acid  Dextroamphetamine-amphetamine 20 mg tablet  Eszopiclone 3 mg tablet  Propranolol 40 mg tablet  Quetiapine 400 mg tablet  Rexulti 1 mg tablet  Zinc sulfate 220 mg tablet  Adjusted  Bupropion SR tablet strength changed from 100 mg to 150 mg  Lurasidone tablet strength changed from 60 mg to 80 mg    [facility] was accessed to determine fill history of any controlled medications. She has most recently filled Vyvanse 70 mg capsules, dextroamphetamine-amphetamine 20 mg tablets, and diazepam 10 mg tablets (all in 2021). 1RxQuery pharmacy benefit data reflects medications filled and processed through the patient's insurance, however                this data does NOT capture whether the medication was picked up or is currently being taken by the patient.        Total Time Spent: 60 minutes    Past Medical History/Disease States:  Past Medical History:   Diagnosis Date    Alcoholic ([address]     High cholesterol     Hypertension     Psychiatric disorder     Bipolar       Patient allergies: Allergies as of 2021    (No Known Allergies)       Prior to Admission Medications   Prescriptions Last Dose Informant Patient Reported? Taking? QUEtiapine (SEROquel) 100 mg tablet   Yes Yes   Sig: Take 100 mg by mouth nightly. Indications: insomnia   albuterol (PROVENTIL HFA, VENTOLIN HFA, PROAIR HFA) 90 mcg/actuation inhaler   No Yes   Sig: Take 2 Puffs by inhalation every four (4) hours as needed for Wheezing. albuterol (PROVENTIL VENTOLIN) 2.5 mg /3 mL (0.083 %) nebu   Yes Yes   Si.5 mg by Nebulization route every six (6) hours as needed for Wheezing or Shortness of Breath. atorvastatin (LIPITOR) 10 mg tablet   Yes Yes   Sig: Take 10 mg by mouth daily. Indications: excessive fat in the blood   buPROPion SR (Wellbutrin SR) 150 mg SR tablet   Yes Yes   Sig: Take 150 mg by mouth daily. Indications: mood disorder   cyanocobalamin (VITAMIN B12) 1,000 mcg/mL injection   Yes Yes   Si,000 mcg by IntraMUSCular route Every Saturday. diazePAM (VALIUM) 10 mg tablet   Yes Yes   Sig: Take 10 mg by mouth nightly. Indications: insomnia   ergocalciferol (ERGOCALCIFEROL) 1,250 mcg (50,000 unit) capsule   Yes Yes   Sig: Take 50,000 Units by mouth Every Saturday. Indications: vitamin D deficiency (high dose therapy)   ferrous sulfate (SLOW FE) 142 mg (45 mg iron) ER tablet   Yes Yes   Sig: Take 45 mg by mouth Daily (before breakfast). hydroCHLOROthiazide (HYDRODIURIL) 25 mg tablet   Yes Yes   Sig: Take 25 mg by mouth daily. Indications: high blood pressure   lisdexamfetamine (Vyvanse) 50 mg cap   Yes Yes   Sig: Take 50 mg by mouth daily. Indications: attention deficit disorder with hyperactivity   lurasidone (Latuda) 80 mg tab tablet   Yes Yes   Sig: Take 80 mg by mouth daily (with dinner). Indications: bipolar disorder   omeprazole (PRILOSEC) 40 mg capsule   Yes Yes   Sig: TAKE 1 CAPSULE BY MOUTH EVERY DAY   primidone (MYSOLINE) 50 mg tablet   No Yes   Sig: Take 1 Tab by mouth nightly.  Indications: essential tremor   propranolol LA (INDERAL LA) 160 mg capsule   Yes Yes   Sig: Take 160 mg by mouth nightly. Indications: essential tremor   topiramate (Topamax) 50 mg tablet   Yes Yes   Sig: Take 50 mg by mouth nightly.  Indications: weight loss      Facility-Administered Medications: None        Thank you,  [facility] Specialist, [address] [place] [address] Nw: [phone])  Pharmacy: [phone] ([id number])

## 2021-03-16 NOTE — ED PROVIDER NOTES
Date: 3/15/2021  Patient Name: Nick Boyce    History of Presenting Illness     No chief complaint on file. History Provided By: Patient    HPI: Nick Boyce, 48 y.o. female with a past medical history of hypertension, hyperlipidemia and bipolar disorder, substance abuse presents to the ED with cc of racing thoughts and suicidal ideation. Patient states that she has a long history of bipolar disorder and had been on lithium for 20 years. However several months ago she had a new provider who changed up several of her medications. She ended up being diagnosed with lithium toxicity back in December and since then has been taking off lithium and is now on [place]. However she states with all of the medication changes, she has began to feel more hypomanic. She states that she has poor sleep, only sleeping 4 to 5 hours a night, and she constantly has racing thoughts. She has been having angry outbursts towards her [employer] members and her friends which is unusual for her. She has threatened to divorce her  3 times this week. She states that she had been starting to feel more suicidal and had planned to take the car and drive into a tree when her  was asleep tonight. She states that she was admitted to a psychiatric facility 20 years ago, but with her medications she had been doing so well until recently. She told her  how she was feeling tonight and they called the crisis hotline and was referred to the emergency department. She denies taking any substances. There are no other complaints, changes, or physical findings at this time. PCP: Pravin Corado MD    No current facility-administered medications on file prior to encounter. Current Outpatient Medications on File Prior to Encounter   Medication Sig Dispense Refill    propranoloL (INDERAL) 40 mg tablet Take 40 mg by mouth daily.       propranolol LA (INDERAL LA) 160 mg capsule Take 160 mg by mouth nightly.  lisdexamfetamine (Vyvanse) 50 mg cap Take 50 mg by mouth daily.  cyanocobalamin (VITAMIN B12) 1,000 mcg/mL injection INJECT 1ML INTRAMUSCULARLY TWICE A WEEK.  omeprazole (PRILOSEC) 40 mg capsule TAKE 1 CAPSULE BY MOUTH EVERY DAY      atorvastatin (LIPITOR) 10 mg tablet Take 10 mg by mouth daily.  Rexulti 1 mg tab tablet TAKE 1 TABLET BY MOUTH AT BEDTIME      eszopiclone (LUNESTA) 3 mg tablet Take 3 mg by mouth nightly as needed.  hydroCHLOROthiazide (HYDRODIURIL) 25 mg tablet       Latuda 60 mg tab tablet Take 60 mg by mouth nightly.  dextroamphetamine-amphetamine (ADDERALL) 20 mg tablet Take 20 mg by mouth two (2) times a day. Morning and 3 PM      primidone (MYSOLINE) 50 mg tablet Take 1 Tab by mouth nightly. Indications: essential tremor 30 Tab 5    albuterol (PROVENTIL HFA, VENTOLIN HFA, PROAIR HFA) 90 mcg/actuation inhaler Take 2 Puffs by inhalation every four (4) hours as needed for Wheezing. 1 Inhaler 0    ascorbic acid, vitamin C, (Vitamin C) 500 mg tablet Take 1 Tab by mouth two (2) times a day. 20 Tab 0    zinc sulfate 220 mg tablet Take 1 Tab by mouth daily. 10 Tab 0    buPROPion SR (WELLBUTRIN SR) 100 mg SR tablet Take 100 mg by mouth two (2) times a day. Indications: depression      QUEtiapine (SEROQUEL) 400 mg tablet Take 800 mg by mouth nightly. Indications: DEPRESSION TREATMENT ADJUNCT      topiramate (Topamax) 50 mg tablet Take 50 mg by mouth nightly.  Indications: alcoholism         Past History     Past Medical History:  Past Medical History:   Diagnosis Date    Alcoholic ([facility] [address]     High cholesterol     Hypertension     Psychiatric disorder     Bipolar       Past Surgical History:  Past Surgical History:   Procedure Laterality Date    HX GYN      D&C       Family History:  Family History   Problem Relation Age of Onset    Alcohol abuse Mother     Heart Disease Mother        Social History:  Social History     Tobacco Use    Smoking status: Current Every Day Smoker     Packs/day: 0.50    Smokeless tobacco: Never Used   Substance Use Topics    Alcohol use: No    Drug use: No       Allergies:  No Known Allergies      Review of Systems   Review of Systems   Constitutional: Negative. HENT: Negative. Eyes: Negative. Respiratory: Negative. Gastrointestinal: Negative. Endocrine: Negative. Genitourinary: Negative. Musculoskeletal: Negative. Skin: Negative. Allergic/Immunologic: Negative. Neurological: Negative. Hematological: Negative. Psychiatric/Behavioral: Positive for agitation, sleep disturbance and suicidal ideas. Negative for hallucinations and self-injury. Physical Exam   Physical Exam  Vitals signs and nursing note reviewed. Constitutional:       General: She is not in acute distress. Appearance: She is well-developed. HENT:      Head: Normocephalic and atraumatic. Eyes:      Conjunctiva/sclera: Conjunctivae normal.   Neck:      Musculoskeletal: Neck supple. Vascular: No JVD. Trachea: No tracheal deviation. Cardiovascular:      Rate and Rhythm: Normal rate and regular rhythm. Heart sounds: No murmur. No friction rub. No gallop. Pulmonary:      Effort: Pulmonary effort is normal. No respiratory distress. Breath sounds: Normal breath sounds. No stridor. No wheezing. Abdominal:      General: Bowel sounds are normal. There is no distension. Palpations: Abdomen is soft. There is no mass. Tenderness: There is no abdominal tenderness. There is no guarding. Musculoskeletal: Normal range of motion. General: No tenderness. Comments: No deformity   Skin:     General: Skin is warm and dry. Findings: No rash. Neurological:      Mental Status: She is alert and oriented to person, place, and time. Comments: No focal deficits   Psychiatric:         Behavior: Behavior normal.         Thought Content: Thought content includes suicidal ideation.  Thought content includes suicidal plan. Judgment: Judgment normal.         Diagnostic Study Results     Labs -  Recent Results (from the past 72 hour(s))   DRUG SCREEN, URINE    Collection Time: 03/15/21 10:04 PM   Result Value Ref Range    AMPHETAMINES Positive (A) NEG      BARBITURATES Negative NEG      BENZODIAZEPINES Positive (A) NEG      COCAINE Negative NEG      METHADONE Negative NEG      OPIATES Negative NEG      PCP(PHENCYCLIDINE) Negative NEG      THC (TH-CANNABINOL) Negative NEG      Drug screen comment (NOTE)    CBC WITH AUTOMATED DIFF    Collection Time: 03/15/21 10:04 PM   Result Value Ref Range    WBC 7.6 3.6 - 11.0 K/uL    RBC 4.73 3.80 - 5.20 M/uL    HGB 14.4 11.5 - 16.0 g/dL    HCT 44.1 35.0 - 47.0 %    MCV 93.2 80.0 - 99.0 FL    MCH 30.4 26.0 - 34.0 PG    MCHC 32.7 30.0 - 36.5 g/dL    RDW 13.6 11.5 - 14.5 %    PLATELET 523 113 - 626 K/uL    MPV 9.6 8.9 - 12.9 FL    NRBC 0.0 0  WBC    ABSOLUTE NRBC 0.00 0.00 - 0.01 K/uL    NEUTROPHILS 39 32 - 75 %    LYMPHOCYTES 46 12 - 49 %    MONOCYTES 9 5 - 13 %    EOSINOPHILS 5 0 - 7 %    BASOPHILS 1 0 - 1 %    IMMATURE GRANULOCYTES 0 0.0 - 0.5 %    ABS. NEUTROPHILS 3.0 1.8 - 8.0 K/UL    ABS. LYMPHOCYTES 3.5 0.8 - 3.5 K/UL    ABS. MONOCYTES 0.7 0.0 - 1.0 K/UL    ABS. EOSINOPHILS 0.4 0.0 - 0.4 K/UL    ABS. BASOPHILS 0.1 0.0 - 0.1 K/UL    ABS. IMM.  GRANS. 0.0 0.00 - 0.04 K/UL    DF AUTOMATED     ETHYL ALCOHOL    Collection Time: 03/15/21 10:04 PM   Result Value Ref Range    ALCOHOL(ETHYL),SERUM <10 <96 MG/DL   METABOLIC PANEL, COMPREHENSIVE    Collection Time: 03/15/21 10:04 PM   Result Value Ref Range    Sodium 138 136 - 145 mmol/L    Potassium 2.6 (LL) 3.5 - 5.1 mmol/L    Chloride 98 97 - 108 mmol/L    CO2 32 21 - 32 mmol/L    Anion gap 8 5 - 15 mmol/L    Glucose 108 (H) 65 - 100 mg/dL    BUN 8 6 - 20 MG/DL    Creatinine 0.87 0.55 - 1.02 MG/DL    BUN/Creatinine ratio 9 (L) 12 - 20      GFR est AA >60 >60 ml/min/1.73m2    GFR est non-AA >60 >60 ml/min/1.73m2    Calcium 9.3 8.5 - 10.1 MG/DL    Bilirubin, total 0.3 0.2 - 1.0 MG/DL    ALT (SGPT) 27 12 - 78 U/L    AST (SGOT) 25 15 - 37 U/L    Alk. phosphatase 77 45 - 117 U/L    Protein, total 6.5 6.4 - 8.2 g/dL    Albumin 3.4 (L) 3.5 - 5.0 g/dL    Globulin 3.1 2.0 - 4.0 g/dL    A-G Ratio 1.1 1.1 - 2.2     MAGNESIUM    Collection Time: 03/15/21 10:04 PM   Result Value Ref Range    Magnesium 1.9 1.6 - 2.4 mg/dL       Radiologic Studies -   No orders to display     CT Results  (Last 48 hours)    None        CXR Results  (Last 48 hours)    None          Medical Decision Making   I am the first provider for this patient. I reviewed the vital signs, available nursing notes, past medical history, past surgical history, family history and social history. Vital Signs-Reviewed the patient's vital signs. Patient Vitals for the past 12 hrs:   Temp Pulse Resp BP SpO2   03/15/21 2123 98.2 °F (36.8 °C) 89 20 (!) 147/89 99 %         Records Reviewed: Nursing Notes and Old Medical Records    Provider Notes (Medical Decision Making):   Patient is a 51-year-old female with a long history of bipolar disorder and substance abuse, who has had recent changes to her medications and has been doing poorly since. Patient displays good insight, and is currently having suicidal thoughts which prompted her visit here today. Will check labs, consult be smart. Patient is voluntary at this time. ED Course:   Initial assessment performed. The patients presenting problems have been discussed, and they are in agreement with the care plan formulated and outlined with them. I have encouraged them to ask questions as they arise throughout their visit.     ED Course as of Mar 16 0643   Tue Mar 16, 2021   0550 [facility] reports that patient will be admitted to [address] [facility] called back and said the patient will actually be going to [address] Tashia Arredondo is the accepting doctor    [CK] ED Course User Index  [CK] Jose Manuel Lorenzo DO       11:36 PM  Noel Macedo at [facility] patient will be admitted for further treatment. Bed search underway now. Disposition:    Transfer to [facility]            Diagnosis     Clinical Impression:   1. Suicidal ideation    2. Urinary tract infection without hematuria, site unspecified    3. Hypokalemia        Attestations:    Augustin Short, DO    Please note that this dictation was completed with [employer], the computer voice recognition software. Quite often unanticipated grammatical, syntax, homophones, and other interpretive errors are inadvertently transcribed by the computer software. Please disregard these errors. Please excuse any errors that have escaped final proofreading. Thank you.

## 2021-03-16 NOTE — PROGRESS NOTES
Laboratory monitoring for mood stabilizer and antipsychotics:    Recommended baseline monitoring has not been completed based on this patient's current medication regimen. The following labs will be ordered with steady-state lithium level to complete baseline monitoring: lipid panel, hemoglobin A1c      The patient is currently taking the following medication(s):   Current Facility-Administered Medications   Medication Dose Route Frequency    [START ON 3/17/2021] atorvastatin (LIPITOR) tablet 10 mg  10 mg Oral DAILY    propranolol LA (INDERAL LA) capsule 160 mg  160 mg Oral QHS    topiramate (TOPAMAX) tablet 50 mg  50 mg Oral QHS    metFORMIN (GLUCOPHAGE) tablet 500 mg  500 mg Oral BID WITH MEALS    lurasidone (LATUDA) tablet 80 mg  80 mg Oral DAILY WITH DINNER    potassium chloride SR (KLOR-CON 10) tablet 40 mEq  40 mEq Oral NOW    QUEtiapine (SEROquel) tablet 100 mg  100 mg Oral QHS    [START ON 3/17/2021] pantoprazole (PROTONIX) tablet 40 mg  40 mg Oral ACB    [START ON 3/17/2021] amLODIPine (NORVASC) tablet 2.5 mg  2.5 mg Oral DAILY    nicotine (NICODERM CQ) 14 mg/24 hr patch 1 Patch  1 Patch TransDERmal DAILY    cephALEXin (KEFLEX) capsule 500 mg  500 mg Oral QID    lithium carbonate capsule 300 mg  300 mg Oral BID       Height, Weight, BMI Estimation  Estimated body mass index is 42.34 kg/m² as calculated from the following:    Height as of 12/21/20: 167.6 cm (66\"). Weight as of 3/15/21: 119 kg (262 lb 5.6 oz). Renal Function, Hepatic Function and Chemistry  CrCl cannot be calculated (Unknown ideal weight.).     Lab Results   Component Value Date/Time    Sodium 138 03/15/2021 10:04 PM    Potassium 2.6 (LL) 03/15/2021 10:04 PM    Chloride 98 03/15/2021 10:04 PM    CO2 32 03/15/2021 10:04 PM    Anion gap 8 03/15/2021 10:04 PM    Glucose 108 (H) 03/15/2021 10:04 PM    BUN 8 03/15/2021 10:04 PM    Creatinine 0.87 03/15/2021 10:04 PM    BUN/Creatinine ratio 9 (L) 03/15/2021 10:04 PM    GFR est AA >60 03/15/2021 10:04 PM    GFR est non-AA >60 03/15/2021 10:04 PM    Calcium 9.3 03/15/2021 10:04 PM    ALT (SGPT) 27 03/15/2021 10:04 PM    Alk.  phosphatase 77 03/15/2021 10:04 PM    Protein, total 6.5 03/15/2021 10:04 PM    Albumin 3.4 (L) 03/15/2021 10:04 PM    Globulin 3.1 03/15/2021 10:04 PM    A-G Ratio 1.1 03/15/2021 10:04 PM    Bilirubin, total 0.3 03/15/2021 10:04 PM       Lab Results   Component Value Date/Time    Glucose 108 (H) 03/15/2021 10:04 PM    Glucose (POC) 116 (H) 03/16/2021 01:15 AM    Glucose (POC) 87 06/05/2012 06:47 PM       Hematology  Lab Results   Component Value Date/Time    WBC 7.6 03/15/2021 10:04 PM    HGB 14.4 03/15/2021 10:04 PM    Hematocrit (POC) 47 03/16/2021 01:15 AM    HCT 44.1 03/15/2021 10:04 PM    PLATELET 870 77/23/6601 10:04 PM    MCV 93.2 03/15/2021 10:04 PM       Thyroid Function    Lab Results   Component Value Date/Time    TSH 8.04 (H) 12/21/2020 03:40 PM    T4, Free 0.6 (L) 12/22/2020 03:07 AM     Vitals  Visit Vitals  /82 (BP 1 Location: Left upper arm, BP Patient Position: At rest)   Pulse 80   Temp 98.6 °F (37 °C)   Resp 18   SpO2 99%       Pregnancy Test  Lab Results   Component Value Date/Time    Pregnancy test,urine (POC) NEGATIVE  11/27/2013 02:36 PM       Jett Hampton, BCPS  [phone] (pharmacy)

## 2021-03-16 NOTE — ED TRIAGE NOTES
States that she is having mood swings, increased malika and endorses SI with plan. Recently had her medication changed and states that she has been struggling with her mental health since she was taken off of Lithium.
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
## 2021-03-16 NOTE — PROGRESS NOTES
Behavioral Services  Medicare Certification Upon Admission    I certify that this patient's inpatient psychiatric hospital admission is medically necessary for:    [x] (1) Treatment which could reasonably be expected to improve this patient's condition,       [x] (2) Or for diagnostic study;     AND     [x](2) The inpatient psychiatric services are provided while the individual is under the care of a physician and are included in the individualized plan of care.     Estimated length of stay/service 5 - 7 days    Plan for post-hospital care per social work    Electronically signed by Ezequiel Lino MD on 3/16/2021 at 11:59 AM

## 2021-03-16 NOTE — ED NOTES
Call from [address] [facility]. Pt to be admitted for further treatment.

## 2021-03-17 LAB
SARS-COV-2, XPLCVT: NOT DETECTED
SOURCE, COVRS: NORMAL

## 2021-03-17 PROCEDURE — 74011250637 HC RX REV CODE- 250/637: Performed by: NURSE PRACTITIONER

## 2021-03-17 PROCEDURE — 74011250637 HC RX REV CODE- 250/637: Performed by: PSYCHIATRY & NEUROLOGY

## 2021-03-17 PROCEDURE — 65220000003 HC RM SEMIPRIVATE PSYCH

## 2021-03-17 RX ORDER — ONDANSETRON 4 MG/1
4 TABLET, ORALLY DISINTEGRATING ORAL
Status: DISCONTINUED | OUTPATIENT
Start: 2021-03-17 | End: 2021-03-17

## 2021-03-17 RX ORDER — PROMETHAZINE HYDROCHLORIDE 25 MG/ML
25 INJECTION, SOLUTION INTRAMUSCULAR; INTRAVENOUS
Status: DISCONTINUED | OUTPATIENT
Start: 2021-03-17 | End: 2021-03-19 | Stop reason: HOSPADM

## 2021-03-17 RX ORDER — ONDANSETRON 2 MG/ML
4 INJECTION INTRAMUSCULAR; INTRAVENOUS
Status: DISCONTINUED | OUTPATIENT
Start: 2021-03-17 | End: 2021-03-17

## 2021-03-17 RX ORDER — PROMETHAZINE HYDROCHLORIDE 25 MG/1
25 TABLET ORAL
Status: DISCONTINUED | OUTPATIENT
Start: 2021-03-17 | End: 2021-03-19 | Stop reason: HOSPADM

## 2021-03-17 RX ORDER — QUETIAPINE FUMARATE 200 MG/1
200 TABLET, FILM COATED ORAL
Status: DISCONTINUED | OUTPATIENT
Start: 2021-03-17 | End: 2021-03-19 | Stop reason: HOSPADM

## 2021-03-17 RX ORDER — METHOCARBAMOL 500 MG/1
500 TABLET, FILM COATED ORAL
Status: DISCONTINUED | OUTPATIENT
Start: 2021-03-17 | End: 2021-03-18

## 2021-03-17 RX ORDER — LOPERAMIDE HYDROCHLORIDE 2 MG/1
4 CAPSULE ORAL
Status: DISCONTINUED | OUTPATIENT
Start: 2021-03-17 | End: 2021-03-19 | Stop reason: HOSPADM

## 2021-03-17 RX ORDER — BUPROPION HYDROCHLORIDE 150 MG/1
150 TABLET, EXTENDED RELEASE ORAL DAILY
Status: DISCONTINUED | OUTPATIENT
Start: 2021-03-17 | End: 2021-03-19 | Stop reason: HOSPADM

## 2021-03-17 RX ADMIN — CEPHALEXIN 500 MG: 250 CAPSULE ORAL at 17:23

## 2021-03-17 RX ADMIN — METHOCARBAMOL 500 MG: 500 TABLET ORAL at 14:01

## 2021-03-17 RX ADMIN — CEPHALEXIN 500 MG: 250 CAPSULE ORAL at 08:41

## 2021-03-17 RX ADMIN — LITHIUM CARBONATE 300 MG: 300 CAPSULE, GELATIN COATED ORAL at 17:23

## 2021-03-17 RX ADMIN — ACETAMINOPHEN 650 MG: 325 TABLET ORAL at 14:02

## 2021-03-17 RX ADMIN — METHOCARBAMOL 500 MG: 500 TABLET ORAL at 22:35

## 2021-03-17 RX ADMIN — LURASIDONE HYDROCHLORIDE 80 MG: 80 TABLET, FILM COATED ORAL at 17:23

## 2021-03-17 RX ADMIN — ONDANSETRON 4 MG: 4 TABLET, ORALLY DISINTEGRATING ORAL at 18:43

## 2021-03-17 RX ADMIN — CEPHALEXIN 500 MG: 250 CAPSULE ORAL at 22:34

## 2021-03-17 RX ADMIN — ACETAMINOPHEN 650 MG: 325 TABLET ORAL at 22:34

## 2021-03-17 RX ADMIN — PANTOPRAZOLE SODIUM 40 MG: 40 TABLET, DELAYED RELEASE ORAL at 06:06

## 2021-03-17 RX ADMIN — BUPROPION HYDROCHLORIDE 150 MG: 150 TABLET, EXTENDED RELEASE ORAL at 14:02

## 2021-03-17 RX ADMIN — AMLODIPINE BESYLATE 2.5 MG: 5 TABLET ORAL at 08:41

## 2021-03-17 RX ADMIN — PROPRANOLOL HYDROCHLORIDE 160 MG: 80 CAPSULE, EXTENDED RELEASE ORAL at 22:34

## 2021-03-17 RX ADMIN — PROMETHAZINE HYDROCHLORIDE 25 MG: 25 TABLET ORAL at 22:04

## 2021-03-17 RX ADMIN — HYDROXYZINE HYDROCHLORIDE 50 MG: 25 TABLET, FILM COATED ORAL at 22:34

## 2021-03-17 RX ADMIN — LOPERAMIDE HYDROCHLORIDE 4 MG: 2 CAPSULE ORAL at 22:35

## 2021-03-17 RX ADMIN — QUETIAPINE FUMARATE 200 MG: 200 TABLET ORAL at 22:35

## 2021-03-17 RX ADMIN — CEPHALEXIN 500 MG: 250 CAPSULE ORAL at 14:01

## 2021-03-17 RX ADMIN — TOPIRAMATE 50 MG: 25 TABLET, FILM COATED ORAL at 22:34

## 2021-03-17 RX ADMIN — METFORMIN HYDROCHLORIDE 500 MG: 500 TABLET ORAL at 08:41

## 2021-03-17 RX ADMIN — ATORVASTATIN CALCIUM 10 MG: 10 TABLET, FILM COATED ORAL at 08:41

## 2021-03-17 RX ADMIN — METFORMIN HYDROCHLORIDE 500 MG: 500 TABLET ORAL at 17:23

## 2021-03-17 RX ADMIN — LITHIUM CARBONATE 300 MG: 300 CAPSULE, GELATIN COATED ORAL at 08:41

## 2021-03-17 NOTE — BH NOTES
Behavioral Health Treatment Team Note     Patient goal(s) for today: continue taking meds as prescribe, engage in unit activities, participate in hygiene/ADLs, prepare for discharge, follow directions from staff, contact support team  Treatment team focus/goals: continue adjusting meds as needed, discharge planning, update support system    Progress note: Patient observed laying in bed. She is requesting Motrin and muscle relaxers due to back spasms. She reports she is \"not so good\" and is requesting medication changes. She also reported on medications she does not think she received since admission. Medications reviewed with MD. Patient reports she is not sleeping. Patient denies SI/HI, AVH. She is focused on getting on helpful medication regimen. No issues overnight or on the unit.     LOS:  1  Expected LOS: 5-7    Insurance info/prescription coverage:  [employer] Medicare  Date of last family contact:  [facility] for Aurelia Moser () [phone]  Family requesting physician contact today:  no  Discharge plan:  Return to home  Guns in the home:  no   Outpatient provider(s):  Sheri FISCHER    Participating treatment team members: Dr. Aurelia Cornelius MD

## 2021-03-17 NOTE — BH NOTES
GROUP THERAPY PROGRESS NOTE    Patient is participating in coping skills group. Group time: 45 minutes    Personal goal for participation: Learn coping skills to reduce negative emotions    Goal orientation: Personal    Group therapy participation: active    Therapeutic interventions reviewed and discussed: Group discussion on why being bored can be a problem and the consequences of boredom that patient have experienced. Patient discussed issues they have had with being bored and ways they have tried to combat boredom. This lead to discussion of coping skills for negative emotions and ways to feel better that each patient has tried or that they have heard of. Discussion of activities that help with boredom, challenges, potential solutions and plans. Impression of participation: Judy Fede actively participated in group and made appropriate comments. She shared with the group that she would like to learn more about auto mechanics. Group members suggested that she search You Tube for how-to videos or take a class at the local [employer].     Nayeli Tinsley BA, MA Internship Student  [facility] LPC [facility]

## 2021-03-17 NOTE — PROGRESS NOTES
2000: Pt in room resting quietly Pt denied SI/HI/AH/VH. Pt denied pain. Pt stated she had \"some\" anxiety and \"some\" depression. Pt expressed she was itching on her lower legs and stated she usually puts a cream on her lower legs at home. There was some redness on her shins and ankles. On call called and PRN benadryl was provided. 2112: Pt accepted scheduled medication. Pt also accepted PRN benadryl for her small rash on her legs, PRN atarax for anxiety relating to her thoughts racing, and PRN milk of magnesia due to constipation. Pts PCR was performed as well.      2330: Pt appears asleep, breathing visible    0300: Pt appears asleep, breathing visible    0530:Pt appears asleep, breathing visible    0606: Pt accepted morning medication. Pt slept for 8 hours.      Problem: Suicide  Goal: *STG: Remains safe in hospital  Outcome: Progressing Towards Goal     Problem: Patient Education: Go to Patient Education Activity  Goal: Patient/Family Education  Outcome: Progressing Towards Goal     Problem: Manic Behavior (Adult/Pediatric)  Goal: *STG: Participates in treatment plan  Outcome: Progressing Towards Goal  Goal: *STG: Demonstrates decrease in delusional thinking  Outcome: Progressing Towards Goal  Goal: *STG: Exhibits improved nutritional intake  Outcome: Progressing Towards Goal  Goal: *STG: Remains safe in hospital  Outcome: Progressing Towards Goal     Problem: Anxiety  Goal: *Alleviation of anxiety  Outcome: Progressing Towards Goal     Problem: Discharge Planning  Goal: *Discharge to safe environment  Outcome: Progressing Towards Goal  Goal: *Knowledge of medication management  Outcome: Progressing Towards Goal

## 2021-03-17 NOTE — BH NOTES
Psychiatric Progress Note    Patient: Ariella Burton MRN: [id number]  SSN: [id number]    YOB: 1970  Age: 48 y.o. Sex: female      Admit Date: 3/16/2021    LOS: 1 day     Subjective:     Ariella Burton  reports feeling edgy and moods are irritable. Denies SI/HI/AH/VH. No aggression or violence. Appropriately interactive and aware. Tolerating medications well. Eating fairly and sleeping poorly (non last evening).     Objective:     Vitals:    03/16/21 1054 03/16/21 2018 03/17/21 0716   BP: 134/82 134/86 127/70   Pulse: 80 88 78   Resp: 18 18 16   Temp: 98.6 °F (37 °C) 98.6 °F (37 °C) 98 °F (36.7 °C)   SpO2: 99% 99% 98%        Mental Status Exam:   Sensorium  oriented to time, place and person   Relations cooperative   Eye Contact appropriate   Appearance:  overweight   Speech:  normal pitch, normal volume and non-pressured   Thought Process: goal directed   Thought Content free of delusions and free of hallucinations   Suicidal ideations none   Mood:  depressed   Affect:  anxious   Memory   adequate   Concentration:  adequate   Insight:  fair and limited   Judgment:  limited       MEDICATIONS:  Current Facility-Administered Medications   Medication Dose Route Frequency    methocarbamoL (ROBAXIN) tablet 500 mg  500 mg Oral QID PRN    QUEtiapine (SEROquel) tablet 200 mg  200 mg Oral QHS    [START ON 3/18/2021] buPROPion SR (WELLBUTRIN SR) tablet 150 mg  150 mg Oral DAILY    OLANZapine (ZyPREXA) tablet 5 mg  5 mg Oral Q6H PRN    haloperidol lactate (HALDOL) injection 5 mg  5 mg IntraMUSCular Q6H PRN    benztropine (COGENTIN) tablet 1 mg  1 mg Oral BID PRN    diphenhydrAMINE (BENADRYL) injection 50 mg  50 mg IntraMUSCular BID PRN    hydrOXYzine HCL (ATARAX) tablet 50 mg  50 mg Oral TID PRN    LORazepam (ATIVAN) injection 1 mg  1 mg IntraMUSCular Q4H PRN    traZODone (DESYREL) tablet 50 mg  50 mg Oral QHS PRN    acetaminophen (TYLENOL) tablet 650 mg  650 mg Oral Q4H PRN    magnesium hydroxide (MILK OF MAGNESIA) 400 mg/5 mL oral suspension 30 mL  30 mL Oral DAILY PRN    albuterol (PROVENTIL HFA, VENTOLIN HFA, PROAIR HFA) inhaler 2 Puff  2 Puff Inhalation Q4H PRN    atorvastatin (LIPITOR) tablet 10 mg  10 mg Oral DAILY    propranolol LA (INDERAL LA) capsule 160 mg  160 mg Oral QHS    topiramate (TOPAMAX) tablet 50 mg  50 mg Oral QHS    metFORMIN (GLUCOPHAGE) tablet 500 mg  500 mg Oral BID WITH MEALS    lurasidone (LATUDA) tablet 80 mg  80 mg Oral DAILY WITH DINNER    pantoprazole (PROTONIX) tablet 40 mg  40 mg Oral ACB    amLODIPine (NORVASC) tablet 2.5 mg  2.5 mg Oral DAILY    nicotine (NICODERM CQ) 14 mg/24 hr patch 1 Patch  1 Patch TransDERmal DAILY    cephALEXin (KEFLEX) capsule 500 mg  500 mg Oral QID    lithium carbonate capsule 300 mg  300 mg Oral BID    diphenhydrAMINE (BENADRYL) capsule 50 mg  50 mg Oral Q6H PRN      DISCUSSION:   the risks and benefits of the proposed medication  patient given opportunity to ask questions    Lab/Data Review: All lab results for the last 24 hours reviewed.      Recent Results (from the past 24 hour(s))   SARS-COV-2, PCR    Collection Time: 03/16/21  4:00 PM    Specimen: Nasopharyngeal   Result Value Ref Range    Specimen source Nasopharyngeal      SARS-CoV-2 Not detected NOTD     SARS-COV-2    Collection Time: 03/16/21 10:15 PM   Result Value Ref Range    SARS-CoV-2 Please find results under separate order           Assessment:     Principal Problem:    Bipolar disorder ([facility] [address] (3/16/2021)    Active Problems:    Syncope and collapse (6/5/2012)      Tremors of nervous system (12/23/2020)        Plan:     Continue current care  Collateral information  Medication modification as appropriate  Robaxin prn muscle spasms  BMP in am  Motrin prn pain  Disposition planning with social work    I certify that this patient's inpatient psychiatric hospital services furnished since the previous certification were, and continue to be, required for treatment that could reasonably be expected to improve the patient's condition, or for diagnostic study, and that the patient continues to need, on a daily basis, active treatment furnished directly by or requiring the supervision of inpatient psychiatric facility personnel. In addition, the hospital records show that services furnished were intensive treatment services, admission or related services, or equivalent services.   Signed By: Olive Chowdhury MD     March 17, 2021

## 2021-03-17 NOTE — GROUP NOTE
SINA  GROUP DOCUMENTATION INDIVIDUAL Group Therapy Note Date: 3/17/2021 Group Start Time: 1100 Group End Time: 1200 Group Topic: Topic Group [facility] [place] [address] ACUTE BEHAV [facility] [place], [address] [facility] GROUP DOCUMENTATION GROUP Group Therapy Note Attendees: 7 Attendance: Did not attend Patient's Goal: Interventions/techniques: 
Shiloh Dey

## 2021-03-17 NOTE — BH NOTES
0800 Report given from [place]. Pt is resting in room. 1000 pt is resting in room. Complaint of back pain/spasms. MD made aware will schedule pt medications. Pt denies si/hi/a/v craig. Medication and meal complaint. Pt complaint of depression. 1200 pt ate lunch. Visible on the unit. 1400 pt is resting in bed. Requested and given robaxin and tylenol for pain. 1600 pt is resting in room. Calm and cooperative. 1800 pt ate dinner. Took scheduled medications. Visible on the unit. Pt was in dayroom and per pt she had a mint that was sent on her tray that got stuck in her throat. Pt coughed mint up and threw up in trash can. PT VSS. See MAR. Pt does not appear to be in acute distress. Pt is talking with staff. Pt states she has a upset stomach. Pt was given sprite. NP was notified. Gave order for Zofran Po or IM for nausea or vomiting. Pt in room resting.

## 2021-03-17 NOTE — GROUP NOTE
SINA  GROUP DOCUMENTATION INDIVIDUAL Group Therapy Note Date: 3/17/2021 Group Start Time: 1500 Group End Time: 0133 Group Topic: Recreational/Music Therapy [facility] [place] [address] ACUTE BEHAV [facility] [place], [address] GROUP DOCUMENTATION GROUP Group Therapy Note Attendees: 6 Attendance: Attended Patient's Goal:  To concentrate on selected task Interventions/techniques: Supported-crafts,games,music Follows Directions: Followed directions Interactions: Interacted appropriately Mental Status: Calm and Flat Behavior/appearance: Attentive, Cooperative and Needed prompting Goals Achieved: Able to engage in interactions and Able to listen to others Additional Notes:  Pt reported not feeling well-left session early Robina Cruz

## 2021-03-17 NOTE — BH NOTES
GROUP THERAPY PROGRESS NOTE    Patient is participating in Discharge Planning Group. Group time: 30 minutes    Personal goal for participation: Process feelings related to discharge and/or feelings/goals for today. Goal orientation: Personal    Group therapy participation: minimal    Therapeutic interventions reviewed and discussed: Group discussion was focused on discharge plans and anxiety related to this. Group members discussed what they planned to do once discharge and discharge plans. Patients discussed their goals for today as well as the weekend and what they are working on with treatment team to get ready for discharge. Impression of participation: Dee Dee Limon actively participated in group, but became tearful and finally stood up to leave saying \"my back really hurts\". \"It's spasming. \"  Dee Dee Limon was referred to her nurse for pain medication. Staff followed patient out of the dayroom to make sure she made it to her nurse. Verified that patient made it to a nurse to request help.     Suzanne Main BA, MA Internship Student  Nelda Soler LPC [facility]

## 2021-03-17 NOTE — H&P
[address] [facility] HISTORY AND PHYSICAL    Name:  Ysabel Olmstead  MR#:  [id number]  :  1970  ACCOUNT #:  [de-identified]  ADMIT DATE:  2021    CHIEF COMPLAINT:  Suicidal ideation, racing thoughts, lithium toxicity, and change in medications. HISTORY OF PRESENT ILLNESS:  This is a 27-year-old  female with history of bipolar disorder, who reports she had not been in the hospital for over 20 years, but for the last week, she has been not sleeping, irritable, cursing out people that she loves including pastors at [employer] and her . She does not feel like herself. Her outpatient provider, who was initially Dr. Shekhar Vuong, who passed her onto a nurse practitioner when he changed services. This person moved out of town to different locations, ([place], [place]), for which she maintained contact, but the practicioner eventually lost her license and continued trying to practice with her changing her medications around and increasing her doses. She eventually got lithium toxic and stopped her medications and decompensated, stopped her Seroquel and she has not been able to sleep ever since they took her off of her 800 mg of Seroquel. She has been feeling more hypomanic at times and being depressed at others. She has threatened to divorce her  at least 3 times in the past week and she was starting to feel more suicidal and she had a plan to take her car and drive into a tree when her  is asleep. Now, stated to the ED provider that she is here to get help for her condition and get her medications straightened out again. PAST PSYCHIATRIC HISTORY:  Bipolar disorder, last hospitalization was over 20 years ago, was followed outpatient by Dr. Shekhar Vuong and then his nurse practitioner until recently. PAST MEDICAL HISTORY:  Hypertension, hyperlipidemia, UTI, familial tremors. ALLERGIES:  NONE KNOWN. SOCIAL HISTORY:  . Grown children.   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 Lives with her  and stepdaughter. Denies alcohol and drugs but smokes about a half a pack of cigarettes and is interested in nicotine patch. MENTAL STATUS EXAM:  Adult female. Calm, cooperative. Speaking calmly but seems a little bit animated and elevated but cooperative. Clear, coherent speech with average rate, volume, and tone. Mood is depressed. Affect, distraught, fair range. Thoughts are linear and goal directed. Denies active suicidal or homicidal ideations. No auditory or visual hallucinations. Aware of her surroundings, location, and situation. Here for management of her condition. DIAGNOSES:  Bipolar disorder, depressed, severe, without psychotic features. PLAN:  Admit for safety and stabilization, medication modification as needed, group therapy and individual therapy. ESTIMATED LENGTH OF STAY:  5-7 days. DISPOSITION:  Planning with Social Work. STRENGTHS:  Willingness for treatment. DISABILITIES:  Unstable treatment team outpatient. MATHEW Ashby MD      [id number]  D:  03/16/2021 23:09  T:  03/17/2021 7:04  JOB #:  [id number]

## 2021-03-17 NOTE — GROUP NOTE
SINA  GROUP DOCUMENTATION INDIVIDUAL Group Therapy Note Date: 3/17/2021 Group Start Time: 0900 Group End Time: 1000 Group Topic: Topic Group [facility] [address] ACUTE BEHAV [facility] [place], [address] GROUP DOCUMENTATION GROUP Group Therapy Note Attendees: 5 Attendance: Attended Patient's Goal:  To participate in [employer] Interventions/techniques: Supported-choices in recovery Follows Directions: Followed directions Interactions: Interacted appropriately Mental Status: Calm and Flat Behavior/appearance: Attentive, Cooperative and Needed prompting Goals Achieved: Able to engage in interactions, Able to listen to others, Able to self-disclose, Discussed coping and Discussed self-esteem issues Additional Notes:   
 
Precious Sultana

## 2021-03-18 LAB
ANION GAP SERPL CALC-SCNC: 8 MMOL/L (ref 5–15)
BACTERIA SPEC CULT: ABNORMAL
BUN SERPL-MCNC: 10 MG/DL (ref 6–20)
BUN/CREAT SERPL: 13 (ref 12–20)
CALCIUM SERPL-MCNC: 9.4 MG/DL (ref 8.5–10.1)
CC UR VC: ABNORMAL
CHLORIDE SERPL-SCNC: 107 MMOL/L (ref 97–108)
CO2 SERPL-SCNC: 30 MMOL/L (ref 21–32)
CREAT SERPL-MCNC: 0.78 MG/DL (ref 0.55–1.02)
GLUCOSE SERPL-MCNC: 123 MG/DL (ref 65–100)
POTASSIUM SERPL-SCNC: 3.6 MMOL/L (ref 3.5–5.1)
SERVICE CMNT-IMP: ABNORMAL
SODIUM SERPL-SCNC: 145 MMOL/L (ref 136–145)

## 2021-03-18 PROCEDURE — 80048 BASIC METABOLIC PNL TOTAL CA: CPT

## 2021-03-18 PROCEDURE — 36415 COLL VENOUS BLD VENIPUNCTURE: CPT

## 2021-03-18 PROCEDURE — 65220000003 HC RM SEMIPRIVATE PSYCH

## 2021-03-18 PROCEDURE — 74011250637 HC RX REV CODE- 250/637: Performed by: PSYCHIATRY & NEUROLOGY

## 2021-03-18 PROCEDURE — 74011250637 HC RX REV CODE- 250/637: Performed by: NURSE PRACTITIONER

## 2021-03-18 RX ORDER — METHOCARBAMOL 500 MG/1
750 TABLET, FILM COATED ORAL
Status: DISCONTINUED | OUTPATIENT
Start: 2021-03-18 | End: 2021-03-19 | Stop reason: HOSPADM

## 2021-03-18 RX ADMIN — ATORVASTATIN CALCIUM 10 MG: 10 TABLET, FILM COATED ORAL at 09:23

## 2021-03-18 RX ADMIN — LITHIUM CARBONATE 300 MG: 300 CAPSULE, GELATIN COATED ORAL at 17:47

## 2021-03-18 RX ADMIN — CEPHALEXIN 500 MG: 250 CAPSULE ORAL at 21:20

## 2021-03-18 RX ADMIN — METFORMIN HYDROCHLORIDE 500 MG: 500 TABLET ORAL at 09:23

## 2021-03-18 RX ADMIN — LITHIUM CARBONATE 300 MG: 300 CAPSULE, GELATIN COATED ORAL at 09:23

## 2021-03-18 RX ADMIN — AMLODIPINE BESYLATE 2.5 MG: 5 TABLET ORAL at 09:23

## 2021-03-18 RX ADMIN — MAGNESIUM HYDROXIDE 30 ML: 2400 SUSPENSION ORAL at 13:02

## 2021-03-18 RX ADMIN — QUETIAPINE FUMARATE 200 MG: 200 TABLET ORAL at 21:20

## 2021-03-18 RX ADMIN — METFORMIN HYDROCHLORIDE 500 MG: 500 TABLET ORAL at 17:44

## 2021-03-18 RX ADMIN — METHOCARBAMOL 750 MG: 500 TABLET ORAL at 21:19

## 2021-03-18 RX ADMIN — LURASIDONE HYDROCHLORIDE 80 MG: 80 TABLET, FILM COATED ORAL at 17:44

## 2021-03-18 RX ADMIN — CEPHALEXIN 500 MG: 250 CAPSULE ORAL at 12:44

## 2021-03-18 RX ADMIN — PROMETHAZINE HYDROCHLORIDE 25 MG: 25 TABLET ORAL at 12:44

## 2021-03-18 RX ADMIN — CEPHALEXIN 500 MG: 250 CAPSULE ORAL at 17:44

## 2021-03-18 RX ADMIN — PROMETHAZINE HYDROCHLORIDE 25 MG: 25 TABLET ORAL at 21:20

## 2021-03-18 RX ADMIN — METHOCARBAMOL 500 MG: 500 TABLET ORAL at 09:23

## 2021-03-18 RX ADMIN — CEPHALEXIN 500 MG: 250 CAPSULE ORAL at 09:23

## 2021-03-18 RX ADMIN — BUPROPION HYDROCHLORIDE 150 MG: 150 TABLET, EXTENDED RELEASE ORAL at 09:23

## 2021-03-18 RX ADMIN — PROPRANOLOL HYDROCHLORIDE 160 MG: 80 CAPSULE, EXTENDED RELEASE ORAL at 21:20

## 2021-03-18 RX ADMIN — PANTOPRAZOLE SODIUM 40 MG: 40 TABLET, DELAYED RELEASE ORAL at 05:42

## 2021-03-18 RX ADMIN — TOPIRAMATE 50 MG: 25 TABLET, FILM COATED ORAL at 21:20

## 2021-03-18 NOTE — GROUP NOTE
SINA  GROUP DOCUMENTATION INDIVIDUAL Group Therapy Note Date: 3/18/2021 Group Start Time: 1500 Group End Time: 1328 Group Topic: Recreational/Music Therapy [facility] [place] [address] ACUTE BEHAV [facility] [place], [address] GROUP DOCUMENTATION GROUP Group Therapy Note Attendees: 4 Attendance: Did not attend Patient's Goal: Interventions/techniques: 
Socorro Joseph

## 2021-03-18 NOTE — PROGRESS NOTES
Problem: Suicide  Goal: *STG: Remains safe in hospital  Outcome: Progressing Towards Goal  Goal: *STG/LTG: No longer expresses self destructive or suicidal thoughts  Outcome: Progressing Towards Goal     Problem: Manic Behavior (Adult/Pediatric)  Goal: *STG: Attends activities and groups  Outcome: Progressing Towards Goal  Goal: *STG/LTG: Complies with medication therapy  Outcome: Progressing Towards Goal     Problem: Anxiety  Goal: *Alleviation of anxiety  Outcome: Progressing Towards Goal     Problem: Falls - Risk of  Goal: *Absence of Falls  Description: Document Ricki Fall Risk and appropriate interventions in the flowsheet.   Outcome: Progressing Towards Goal  Note: Fall Risk Interventions:     Medication Interventions: Teach patient to arise slowly

## 2021-03-18 NOTE — BH NOTES
Primary  reported that patient asked about a one on one session. Attempted to meet with patient. She was laying in bed and reported not feeling well. She reported not feeling up to talking at this time. Reminded patient that Jassi Camacho would have group and snacks if she felt like getting up. She declined reporting concern she may vomit. She reports staff is aware she is not feeling well.  informed of attempt for individual session.     Brian Vega LPC [facility]

## 2021-03-18 NOTE — BH NOTES
Psychiatric Progress Note    Patient: Patricia Simmons MRN: [id number]  SSN: [id number]    YOB: 1970  Age: 48 y.o. Sex: female      Admit Date: 3/16/2021    LOS: 2 days     Subjective:     Patricia Simmons  reports feeling edgy and moods are irritable. Denies SI/HI/AH/VH. No aggression or violence. Appropriately interactive and aware. Tolerating medications well. Eating fairly and sleeping poorly (non last evening). 3/18 - Patricia Simmons reports feeling better today, but was choked by a mint at dinner and was nauseas much of the night. States feeling well mentally, but is falling apart physically. Having back spasms and difficulty falling asleep. Awoke with SpO2 of 88% improved with deep breaths to 93% per nursing. Would like to talk with a therapist and or pastoral care to discuss things individually vs in groups. Moods are depressed. Denies SI/HI/AH/VH. No aggression or violence. Appropriately interactive and aware. Tolerating medications well. Eating and sleeping fairly. Discussed needing respit from her friends and neighbors who she helps from time to time but they do not take into account her situation.       Objective:     Vitals:    03/17/21 0716 03/17/21 1806 03/17/21 2113 03/18/21 0837   BP: 127/70 133/80 (!) 164/84 124/62   Pulse: 78 89 78 73   Resp: 16 18 18 18   Temp: 98 °F (36.7 °C) 97.9 °F (36.6 °C) 98.1 °F (36.7 °C) 98 °F (36.7 °C)   SpO2: 98% 99% 97% 93%        Mental Status Exam:   Sensorium  oriented to time, place and person   Relations cooperative   Eye Contact appropriate   Appearance:  overweight   Speech:  normal pitch, normal volume and non-pressured   Thought Process: goal directed   Thought Content free of delusions and free of hallucinations   Suicidal ideations none   Mood:  depressed   Affect:  anxious   Memory   adequate   Concentration:  adequate   Insight:  fair and limited   Judgment:  limited       MEDICATIONS:  Current Facility-Administered Medications   Medication Dose Route Frequency    methocarbamoL (ROBAXIN) tablet 750 mg  750 mg Oral QID PRN    QUEtiapine (SEROquel) tablet 200 mg  200 mg Oral QHS    buPROPion SR (WELLBUTRIN SR) tablet 150 mg  150 mg Oral DAILY    loperamide (IMODIUM) capsule 4 mg  4 mg Oral Q12H PRN    promethazine (PHENERGAN) tablet 25 mg  25 mg Oral Q8H PRN    promethazine (PHENERGAN) injection 25 mg  25 mg IntraMUSCular Q8H PRN    OLANZapine (ZyPREXA) tablet 5 mg  5 mg Oral Q6H PRN    haloperidol lactate (HALDOL) injection 5 mg  5 mg IntraMUSCular Q6H PRN    benztropine (COGENTIN) tablet 1 mg  1 mg Oral BID PRN    diphenhydrAMINE (BENADRYL) injection 50 mg  50 mg IntraMUSCular BID PRN    hydrOXYzine HCL (ATARAX) tablet 50 mg  50 mg Oral TID PRN    LORazepam (ATIVAN) injection 1 mg  1 mg IntraMUSCular Q4H PRN    traZODone (DESYREL) tablet 50 mg  50 mg Oral QHS PRN    acetaminophen (TYLENOL) tablet 650 mg  650 mg Oral Q4H PRN    magnesium hydroxide (MILK OF MAGNESIA) 400 mg/5 mL oral suspension 30 mL  30 mL Oral DAILY PRN    albuterol (PROVENTIL HFA, VENTOLIN HFA, PROAIR HFA) inhaler 2 Puff  2 Puff Inhalation Q4H PRN    atorvastatin (LIPITOR) tablet 10 mg  10 mg Oral DAILY    propranolol LA (INDERAL LA) capsule 160 mg  160 mg Oral QHS    topiramate (TOPAMAX) tablet 50 mg  50 mg Oral QHS    metFORMIN (GLUCOPHAGE) tablet 500 mg  500 mg Oral BID WITH MEALS    lurasidone (LATUDA) tablet 80 mg  80 mg Oral DAILY WITH DINNER    pantoprazole (PROTONIX) tablet 40 mg  40 mg Oral ACB    amLODIPine (NORVASC) tablet 2.5 mg  2.5 mg Oral DAILY    nicotine (NICODERM CQ) 14 mg/24 hr patch 1 Patch  1 Patch TransDERmal DAILY    cephALEXin (KEFLEX) capsule 500 mg  500 mg Oral QID    lithium carbonate capsule 300 mg  300 mg Oral BID    diphenhydrAMINE (BENADRYL) capsule 50 mg  50 mg Oral Q6H PRN      DISCUSSION:   the risks and benefits of the proposed medication  patient given opportunity to ask questions    Lab/Data Review: All lab results for the last 24 hours reviewed. Recent Results (from the past 24 hour(s))   METABOLIC PANEL, BASIC    Collection Time: 03/18/21  5:12 AM   Result Value Ref Range    Sodium 145 136 - 145 mmol/L    Potassium 3.6 3.5 - 5.1 mmol/L    Chloride 107 97 - 108 mmol/L    CO2 30 21 - 32 mmol/L    Anion gap 8 5 - 15 mmol/L    Glucose 123 (H) 65 - 100 mg/dL    BUN 10 6 - 20 MG/DL    Creatinine 0.78 0.55 - 1.02 MG/DL    BUN/Creatinine ratio 13 12 - 20      GFR est AA >60 >60 ml/min/1.73m2    GFR est non-AA >60 >60 ml/min/1.73m2    Calcium 9.4 8.5 - 10.1 MG/DL         Assessment:     Principal Problem:    Bipolar disorder (HCC) (3/16/2021)    Active Problems:    Syncope and collapse (6/5/2012)      Tremors of nervous system (12/23/2020)        Plan:     Continue current care  Collateral information  Medication modification as appropriate  Robaxin prn muscle spasms  Motrin prn pain  Disposition planning with social work    I certify that this patient's inpatient psychiatric hospital services furnished since the previous certification were, and continue to be, required for treatment that could reasonably be expected to improve the patient's condition, or for diagnostic study, and that the patient continues to need, on a daily basis, active treatment furnished directly by or requiring the supervision of inpatient psychiatric facility personnel. In addition, the hospital records show that services furnished were intensive treatment services, admission or related services, or equivalent services.   Signed By: Merna Stephens MD     March 18, 2021

## 2021-03-18 NOTE — PROGRESS NOTES
Problem: Suicide  Goal: *STG: Remains safe in hospital  Outcome: Progressing Towards Goal  Goal: *STG/LTG: Complies with medication therapy  Outcome: Progressing Towards Goal  Goal: *STG/LTG: No longer expresses self destructive or suicidal thoughts  Outcome: Progressing Towards Goal     Problem: Manic Behavior (Adult/Pediatric)  Goal: *STG: Participates in treatment plan  Outcome: Progressing Towards Goal  Goal: *STG: Seeks staff when feelings of anxiety and fear arise  Outcome: Progressing Towards Goal  Goal: *STG: Remains safe in hospital  Outcome: Progressing Towards Goal  Goal: *STG/LTG: Complies with medication therapy  Outcome: Progressing Towards Goal     Problem: Falls - Risk of  Goal: *Absence of Falls  Description: Document Ricki Fall Risk and appropriate interventions in the flowsheet.   Outcome: Progressing Towards Goal  Note: Fall Risk Interventions:            Medication Interventions: Teach patient to arise slowly

## 2021-03-18 NOTE — GROUP NOTE
SINA  GROUP DOCUMENTATION INDIVIDUAL Group Therapy Note Date: 3/18/2021 Group Start Time: 0900 Group End Time: 1000 Group Topic: Topic Group [address] 3 ACUTE BEHAV [facility] GROUP DOCUMENTATION GROUP Group Therapy Note Attendees: 4 Attendance: Did not attend Patient's Goal: Interventions/techniques: 
Lidya Henson

## 2021-03-18 NOTE — PROGRESS NOTES
Assumed care of patient after receiving shift report from outgoing nurse. Patient was out and visible on unit. Pt makes good eye contact. A&O x 4. Affect was full range, mood was anxious. Hygiene is adequate, independent in ADLs. Gait is steady, pt reports familial tremors, observed. Pt reports not sleeping for the past week in a manic episode. Appetite patterns WNL. Denies AVH/SI/HI/Depression. Endorses Anxiety. Last BM was today described as diarrhea. Pt reports back pain 7/10 described as spasms. Pt states she got a mint stuck in her throat from dinner and has been vomiting since then. Pt received Zofran but states it is not effective. Emesis basin noted with Pt. Pt encouraged to continue to participate in care. NP on call, Freya Finn,  notified of diarrhea and N/V. Orders obtained for Imodium and Phenergan. 2245: Pt had an outburst of frustration. Stated she was told a nurse would be back in \"30 minutes and it has been an hour and a half\". Pt was able to de-escalate after explaining process to obtain new orders for medication. Pt apologized for outburst.    1634: Phenergan administered for N/V    2235: Atarax, Roboxin, Tylenol, and Imodium administered for anxiety, muscle spasms/pain, and diarrhea. 0000: Pt has been quiet in room, no evidence of emesis episodes post medication administration. 0400: Pt observed in bed asleep    0530: Labs: BMP    0630: Pt has been observed throughout the night. Total hours slept approximately 5.75. No s/s of distress noted. Patient has remained safe.

## 2021-03-18 NOTE — BH NOTES
Behavioral Health Treatment Team Note      Patient goal(s) for today: continue taking meds as prescribe, engage in unit activities, participate in hygiene/ADLs, prepare for discharge, follow directions from staff, contact support team  Treatment team focus/goals: continue adjusting meds as needed, discharge planning, update support system     Progress note: Nurse notified that patient's oxygen was low this morning (88) but improved once she woke up properly. Patient also had diarrhea and vomiting (attributed to choking on a mint after dinner). MD to speak with medical and request consult if needed. Patient reports she feels \"so much better\" but notes that she feels better mentally but not physically. She reports going to groups. Medications reviewed. Patient's robaxin to be increased. Patient expressed interest in discharge. Processed with patient regarding recent medication changes, health concerns, and desire for patient to to have safe discharge plan. Patient discussed her desire to have a place where she can take a break from people she doesn't like and plan for follow-up care. Patient requesting spiritual care (MD to request consult) and 1:1 counseling (counselor notified).      LOS:  2                       Expected LOS: 5-7     Insurance info/prescription coverage:  [employer] Medicare  Date of last family contact:  [facility] for Andre Mccarthy () [phone]  Family requesting physician contact today:  no  Discharge plan:  Return to home  Guns in the home:  no   Outpatient provider(s):  Sheri FISCHER     Participating treatment team members: Dr. Storm Willard MD

## 2021-03-18 NOTE — GROUP NOTE
SINA  GROUP DOCUMENTATION INDIVIDUAL Group Therapy Note Date: 3/18/2021 Group Start Time: 1100 Group End Time: 1200 Group Topic: Topic Group [facility] 3 ACUTE BEHAV [facility], [address] GROUP DOCUMENTATION GROUP Group Therapy Note Attendees: 6 Attendance: Attended Patient's Goal:  To participate in relaxation activity Interventions/techniques: Supported-art Follows Directions: Followed directions Interactions: Interacted appropriately Mental Status: Calm and Flat Behavior/appearance: Attentive,cooperative Goals Achieved: Able to engage in interactions, Able to listen to others and Discussed coping Additional Notes:  Reported not feeling well but wanted to participate in group 
session Timbo Garcia

## 2021-03-18 NOTE — BH NOTES
GROUP THERAPY PROGRESS NOTE    Patient did not participate in Substance abuse group.      Shayna Lewis LPC [facility]

## 2021-03-18 NOTE — PROGRESS NOTES
Patient received resting in her room. Denies SI/HI/AVH, depression and anxiety. Calm, cooperative, continues to complain about stomach issues, isolative to her room. Patient requested Phenergan for nausea which was administered but patient ended up vomiting about 15 minutes later. Milk of Magnesia administered. Taking medications as prescribed. Will continue to monitor for safety.

## 2021-03-19 VITALS
DIASTOLIC BLOOD PRESSURE: 75 MMHG | TEMPERATURE: 98.3 F | OXYGEN SATURATION: 96 % | HEART RATE: 63 BPM | SYSTOLIC BLOOD PRESSURE: 121 MMHG | RESPIRATION RATE: 16 BRPM

## 2021-03-19 PROCEDURE — 74011250637 HC RX REV CODE- 250/637: Performed by: NURSE PRACTITIONER

## 2021-03-19 PROCEDURE — 74011250636 HC RX REV CODE- 250/636: Performed by: NURSE PRACTITIONER

## 2021-03-19 PROCEDURE — 74011250637 HC RX REV CODE- 250/637: Performed by: PSYCHIATRY & NEUROLOGY

## 2021-03-19 RX ORDER — CEPHALEXIN 500 MG/1
500 CAPSULE ORAL 4 TIMES DAILY
Qty: 10 CAP | Refills: 0 | Status: SHIPPED | OUTPATIENT
Start: 2021-03-19

## 2021-03-19 RX ORDER — AMLODIPINE BESYLATE 2.5 MG/1
2.5 TABLET ORAL DAILY
Qty: 30 TAB | Refills: 0 | Status: SHIPPED | OUTPATIENT
Start: 2021-03-19

## 2021-03-19 RX ORDER — LOPERAMIDE HYDROCHLORIDE 2 MG/1
4 CAPSULE ORAL
Qty: 10 CAP | Refills: 0 | Status: SHIPPED | OUTPATIENT
Start: 2021-03-19 | End: 2021-03-29

## 2021-03-19 RX ORDER — QUETIAPINE FUMARATE 200 MG/1
200 TABLET, FILM COATED ORAL
Qty: 30 TAB | Refills: 0 | Status: SHIPPED | OUTPATIENT
Start: 2021-03-19

## 2021-03-19 RX ORDER — METHOCARBAMOL 750 MG/1
750 TABLET, FILM COATED ORAL
Qty: 120 TAB | Refills: 0 | Status: SHIPPED | OUTPATIENT
Start: 2021-03-19

## 2021-03-19 RX ORDER — PROMETHAZINE HYDROCHLORIDE 25 MG/1
25 TABLET ORAL
Qty: 30 TAB | Refills: 0 | Status: SHIPPED | OUTPATIENT
Start: 2021-03-19

## 2021-03-19 RX ORDER — LITHIUM CARBONATE 300 MG/1
300 CAPSULE ORAL 2 TIMES DAILY
Qty: 60 CAP | Refills: 0 | Status: SHIPPED | OUTPATIENT
Start: 2021-03-19

## 2021-03-19 RX ORDER — METFORMIN HYDROCHLORIDE 500 MG/1
1 TABLET, FILM COATED, EXTENDED RELEASE ORAL
Qty: 30 TAB | Refills: 0 | Status: SHIPPED | OUTPATIENT
Start: 2021-03-19

## 2021-03-19 RX ADMIN — ATORVASTATIN CALCIUM 10 MG: 10 TABLET, FILM COATED ORAL at 08:00

## 2021-03-19 RX ADMIN — CEPHALEXIN 500 MG: 250 CAPSULE ORAL at 09:08

## 2021-03-19 RX ADMIN — PROMETHAZINE HYDROCHLORIDE 25 MG: 25 INJECTION INTRAMUSCULAR; INTRAVENOUS at 10:02

## 2021-03-19 RX ADMIN — LOPERAMIDE HYDROCHLORIDE 4 MG: 2 CAPSULE ORAL at 03:07

## 2021-03-19 RX ADMIN — CEPHALEXIN 500 MG: 250 CAPSULE ORAL at 11:40

## 2021-03-19 RX ADMIN — PANTOPRAZOLE SODIUM 40 MG: 40 TABLET, DELAYED RELEASE ORAL at 05:55

## 2021-03-19 RX ADMIN — METFORMIN HYDROCHLORIDE 500 MG: 500 TABLET ORAL at 09:12

## 2021-03-19 RX ADMIN — AMLODIPINE BESYLATE 2.5 MG: 5 TABLET ORAL at 09:08

## 2021-03-19 RX ADMIN — BUPROPION HYDROCHLORIDE 150 MG: 150 TABLET, EXTENDED RELEASE ORAL at 09:08

## 2021-03-19 RX ADMIN — LITHIUM CARBONATE 300 MG: 300 CAPSULE, GELATIN COATED ORAL at 09:08

## 2021-03-19 NOTE — DISCHARGE SUMMARY
PSYCHIATRIC DISCHARGE SUMMARY         IDENTIFICATION:    Patient Name  Jaswinder Omalley   Date of Birth 1970   [facility] [id number]   Medical Record Number  [id number]      Age  48 y.o. PCP Arnoldo Way MD   Admit date:  3/16/2021    Discharge date: 3/19/2021   Room Number  329/01  @ [facility]   Date of Service  3/19/2021            TYPE OF DISCHARGE: REGULAR               CONDITION AT DISCHARGE: improved and fair       PROVISIONAL & DISCHARGE DIAGNOSES:    Problem List  Date Reviewed: 12/23/2020          Codes Class    * (Principal) Bipolar disorder ([facility].) ICD-10-CM: F31.9  ICD-9-CM: 296.80         Nausea and vomiting ICD-10-CM: R11.2  ICD-9-CM: 787.01         Tremors of nervous system ICD-10-CM: R25.1  ICD-9-CM: 781.0         Lithium toxicity ICD-10-CM: H13.663K  ICD-9-CM: 985.8, E980.9         Syncope and collapse ICD-10-CM: R55  ICD-9-CM: 780.2               Active Hospital Problems    *Bipolar disorder ([facility].)      Tremors of nervous system      Syncope and collapse        DISCHARGE DIAGNOSIS:   Axis I:  SEE ABOVE  Axis II: SEE ABOVE  Axis III: SEE ABOVE  Axis IV:  lack of structure  Axis V:  <50 on admission, 55+ on discharge     CC & HISTORY OF PRESENT ILLNESS:   75-year-old  female with history of bipolar disorder, who reports she had not been in the hospital for over 20 years, but for the last week, she has been not sleeping, irritable, cursing out people that she loves including pastors at [employer] and her . She does not feel like herself. Her outpatient provider, who was initially Dr. Chris Plaza, who passed her onto a nurse practitioner when he changed services. This person moved out of town to different locations, ([place], [place]), for which she maintained contact, but the practicioner eventually lost her license and continued trying to practice with her changing her medications around and increasing her doses.   She eventually got lithium toxic and stopped her medications and decompensated, stopped her Seroquel and she has not been able to sleep ever since they took her off of her 800 mg of Seroquel. She has been feeling more hypomanic at times and being depressed at others. She has threatened to divorce her  at least 3 times in the past week and she was starting to feel more suicidal and she had a plan to take her car and drive into a tree when her  is asleep. Now, stated to the ED provider that she is here to get help for her condition and get her medications straightened out again.      SOCIAL HISTORY:    Social History     Socioeconomic History    Marital status:      Spouse name: Not on file    Number of children: Not on file    Years of education: Not on file    Highest education level: Not on file   Occupational History    Not on file   Social Needs    Financial resource strain: Not on file    Food insecurity     Worry: Not on file     Inability: Not on file    Transportation needs     Medical: Not on file     Non-medical: Not on file   Tobacco Use    Smoking status: Current Every Day Smoker     Packs/day: 0.50    Smokeless tobacco: Never Used   Substance and Sexual Activity    Alcohol use: No    Drug use: No    Sexual activity: Yes     Partners: Male     Birth control/protection: None   Lifestyle    Physical activity     Days per week: Not on file     Minutes per session: Not on file    Stress: Not on file   Relationships    Social connections     Talks on phone: Not on file     Gets together: Not on file     Attends [employer] service: Not on file     Active member of club or organization: Not on file     Attends meetings of clubs or organizations: Not on file     Relationship status: Not on file    Intimate partner violence     Fear of current or ex partner: Not on file     Emotionally abused: Not on file     Physically abused: Not on file     Forced sexual activity: Not on file   Other Topics Concern    Not on file Social History Narrative    Not on file      FAMILY HISTORY:   Family History   Problem Relation Age of Onset    Alcohol abuse Mother     Heart Disease Mother              HOSPITALIZATION COURSE:    Joseluis Alex was admitted to the inpatient psychiatric unit [facility] for acute psychiatric stabilization in regards to symptomatology as described in the HPI above. The differential diagnosis at time of admission included: schizophrenia vs substance induced psychotic disorder schizoaffective vs bipolar MDD vs adjustment disorder. While on the unit Joseluis Alex was involved in individual, group, occupational and milieu therapy. Psychiatric medications were adjusted during this hospitalization. Joseluis Alex demonstrated a progressive improvement in overall condition. Much of patient's initial presentation appeared to be related to situational stressors, effects of medication non-compliance drugs of abuse, and psychological factors. Please see individual progress notes for more specific details regarding patient's hospitalization course. Joseluis Alex reports feeling mentally well and moods are good, but she can not rest well in this space. Discussed her feelings of being overwhelmed prior to admission and having a better handle on things today. Denies SI/HI/AH/VH. No aggression or violence. Appropriately interactive and aware. Tolerating medications well. Eating and sleeping fairly. Notes her physical condition is her biggest issue now and she wants to manage it from home. Patient with request for discharge today. There are no grounds to seek a TDO. At time of discharge, Joseluis Alex is without significant problems of depression, psychosis, or malika. Patient free of suicidal and homicidal ideations (appears to be at very low risk of suicide or homicide) and reports many positive predictive factors in terms of not attempting suicide or homicide.  Overall presentation at time of discharge is most consistent with the diagnosis of Bipolar Disorder depressed. Patient has maximized benefit to be derived from acute inpatient psychiatric treatment. All members of the treatment team concur with each other in regards to plans for discharge today. Patient and family are aware and in agreement with discharge and discharge plan.          LABS AND IMAGAING:    Labs Reviewed   METABOLIC PANEL, BASIC - Abnormal; Notable for the following components:       Result Value    Glucose 123 (*)     All other components within normal limits   SARS-COV-2   SARS-COV-2     No results found for: DS35, PHEN, PHENO, PHENT, DILF, DS39, PHENY, PTN, VALF2, VALAC, VALP, VALPR, DS6, CRBAM, CRBAMP, CARB2, XCRBAM  Admission on 03/16/2021   Component Date Value Ref Range Status    SARS-CoV-2 03/16/2021 Please find results under separate order    Final    Specimen source 03/16/2021 Nasopharyngeal    Final    SARS-CoV-2 03/16/2021 Not detected  NOTD   Final    Sodium 03/18/2021 145  136 - 145 mmol/L Final    Potassium 03/18/2021 3.6  3.5 - 5.1 mmol/L Final    Chloride 03/18/2021 107  97 - 108 mmol/L Final    CO2 03/18/2021 30  21 - 32 mmol/L Final    Anion gap 03/18/2021 8  5 - 15 mmol/L Final    Glucose 03/18/2021 123* 65 - 100 mg/dL Final    BUN 03/18/2021 10  6 - 20 MG/DL Final    Creatinine 03/18/2021 0.78  0.55 - 1.02 MG/DL Final    BUN/Creatinine ratio 03/18/2021 13  12 - 20   Final    GFR est AA 03/18/2021 >60  >60 ml/min/1.73m2 Final    GFR est non-AA 03/18/2021 >60  >60 ml/min/1.73m2 Final    Calcium 03/18/2021 9.4  8.5 - 10.1 MG/DL Final   Admission on 03/15/2021, Discharged on 03/16/2021   Component Date Value Ref Range Status    AMPHETAMINES 03/15/2021 Positive* NEG   Final    BARBITURATES 03/15/2021 Negative  NEG   Final    BENZODIAZEPINES 03/15/2021 Positive* NEG   Final    COCAINE 03/15/2021 Negative  NEG   Final    METHADONE 03/15/2021 Negative  NEG   Final    OPIATES 03/15/2021 Negative  NEG   Final    PCP(PHENCYCLIDINE) 03/15/2021 Negative  NEG   Final    THC (TH-CANNABINOL) 03/15/2021 Negative  NEG   Final    Drug screen comment 03/15/2021 (NOTE)   Final    WBC 03/15/2021 7.6  3.6 - 11.0 K/uL Final    RBC 03/15/2021 4.73  3.80 - 5.20 M/uL Final    HGB 03/15/2021 14.4  11.5 - 16.0 g/dL Final    HCT 03/15/2021 44.1  35.0 - 47.0 % Final    MCV 03/15/2021 93.2  80.0 - 99.0 FL Final    MCH 03/15/2021 30.4  26.0 - 34.0 PG Final    MCHC 03/15/2021 32.7  30.0 - 36.5 g/dL Final    RDW 03/15/2021 13.6  11.5 - 14.5 % Final    PLATELET 08/34/4978 414  150 - 400 K/uL Final    MPV 03/15/2021 9.6  8.9 - 12.9 FL Final    NRBC 03/15/2021 0.0  0  WBC Final    ABSOLUTE NRBC 03/15/2021 0.00  0.00 - 0.01 K/uL Final    NEUTROPHILS 03/15/2021 39  32 - 75 % Final    LYMPHOCYTES 03/15/2021 46  12 - 49 % Final    MONOCYTES 03/15/2021 9  5 - 13 % Final    EOSINOPHILS 03/15/2021 5  0 - 7 % Final    BASOPHILS 03/15/2021 1  0 - 1 % Final    IMMATURE GRANULOCYTES 03/15/2021 0  0.0 - 0.5 % Final    ABS. NEUTROPHILS 03/15/2021 3.0  1.8 - 8.0 K/UL Final    ABS. LYMPHOCYTES 03/15/2021 3.5  0.8 - 3.5 K/UL Final    ABS. MONOCYTES 03/15/2021 0.7  0.0 - 1.0 K/UL Final    ABS. EOSINOPHILS 03/15/2021 0.4  0.0 - 0.4 K/UL Final    ABS. BASOPHILS 03/15/2021 0.1  0.0 - 0.1 K/UL Final    ABS. IMM.  GRANS. 03/15/2021 0.0  0.00 - 0.04 K/UL Final    DF 03/15/2021 AUTOMATED    Final    ALCOHOL(ETHYL),SERUM 03/15/2021 <10  <10 MG/DL Final    Sodium 03/15/2021 138  136 - 145 mmol/L Final    Potassium 03/15/2021 2.6* 3.5 - 5.1 mmol/L Final    Chloride 03/15/2021 98  97 - 108 mmol/L Final    CO2 03/15/2021 32  21 - 32 mmol/L Final    Anion gap 03/15/2021 8  5 - 15 mmol/L Final    Glucose 03/15/2021 108* 65 - 100 mg/dL Final    BUN 03/15/2021 8  6 - 20 MG/DL Final    Creatinine 03/15/2021 0.87  0.55 - 1.02 MG/DL Final    BUN/Creatinine ratio 03/15/2021 9* 12 - 20   Final    GFR est AA 03/15/2021 >60  >60 ml/min/1.73m2 Final    GFR est non-AA 03/15/2021 >60  >60 ml/min/1.73m2 Final    Calcium 03/15/2021 9.3  8.5 - 10.1 MG/DL Final    Bilirubin, total 03/15/2021 0.3  0.2 - 1.0 MG/DL Final    ALT (SGPT) 03/15/2021 27  12 - 78 U/L Final    AST (SGOT) 03/15/2021 25  15 - 37 U/L Final    Alk. phosphatase 03/15/2021 77  45 - 117 U/L Final    Protein, total 03/15/2021 6.5  6.4 - 8.2 g/dL Final    Albumin 03/15/2021 3.4* 3.5 - 5.0 g/dL Final    Globulin 03/15/2021 3.1  2.0 - 4.0 g/dL Final    A-G Ratio 03/15/2021 1.1  1.1 - 2.2   Final    Magnesium 03/15/2021 1.9  1.6 - 2.4 mg/dL Final    Specimen source 03/15/2021 Nasopharyngeal    Final    COVID-19 rapid test 03/15/2021 Not detected  NOTD   Final    Calcium, ionized (POC) 03/16/2021 1.29  1.12 - 1.32 mmol/L Final    Sodium (POC) 03/16/2021 139  136 - 145 mmol/L Final    Potassium (POC) 03/16/2021 3.1* 3.5 - 5.1 mmol/L Final    Chloride (POC) 03/16/2021 93* 98 - 107 mmol/L Final    CO2 (POC) 03/16/2021 34* 21 - 32 mmol/L Final    Anion gap (POC) 03/16/2021 16  10 - 20 mmol/L Final    Glucose (POC) 03/16/2021 116* 65 - 100 mg/dL Final    BUN (POC) 03/16/2021 7* 9 - 20 mg/dL Final    Creatinine (POC) 03/16/2021 1.0  0.6 - 1.3 mg/dL Final    GFRAA, POC 03/16/2021 >60  >60 ml/min/1.73m2 Final    GFRNA, POC 03/16/2021 59* >60 ml/min/1.73m2 Final    Hematocrit (POC) 03/16/2021 47  35.0 - 47.0 % Final    Comment 03/16/2021 Comment Not Indicated.     Final    Color 03/15/2021 YELLOW/STRAW    Final    Appearance 03/15/2021 CLEAR  CLEAR   Final    Specific gravity 03/15/2021 1.010  1.003 - 1.030   Final    pH (UA) 03/15/2021 6.0  5.0 - 8.0   Final    Protein 03/15/2021 Negative  NEG mg/dL Final    Glucose 03/15/2021 Negative  NEG mg/dL Final    Ketone 03/15/2021 Negative  NEG mg/dL Final    Bilirubin 03/15/2021 Negative  NEG   Final    Blood 03/15/2021 Negative  NEG   Final    Urobilinogen 03/15/2021 0.2  0.2 - 1.0 EU/dL Final    Nitrites 03/15/2021 Positive* NEG   Final    Leukocyte Esterase 03/15/2021 SMALL* NEG   Final    WBC 03/15/2021 10-20  0 - 4 /hpf Final    RBC 03/15/2021 0-5  0 - 5 /hpf Final    Epithelial cells 03/15/2021 FEW  FEW /lpf Final    Bacteria 03/15/2021 3+* NEG /hpf Final    UA:UC IF INDICATED 03/15/2021 URINE CULTURE ORDERED    Final    Special Requests: 03/15/2021     Final                    Value:NO SPECIAL REQUESTS  Reflexed from [id number]      Skandia Count 03/15/2021     Final                    Value:>100,000  COLONIES/mL      Culture result: 03/15/2021 ESCHERICHIA COLI*   Final    Specimen source 03/16/2021 Nasopharyngeal    Final    SARS-CoV-2 03/16/2021 Not detected  NOTD   Final     No results found. DISPOSITION:    Home. Patient to f/u with psychiatric, and psychotherapy appointments. Patient is to f/u with internist as directed. FOLLOW-UP CARE:    Activity as tolerated  Regular diet  Wound Care: none needed. Follow-up Information     Follow up With Specialties Details Why Contact Info    Jaz Chan MD Family Medicine Call today Please follow up regarding medical concerns [place]  [phone]      [facility]  Call today Please call Aurelia Robison at [phone] to confirm your services with Dr. Illene Brunner. [facility] [address] [phone]                 PROGNOSIS:   Fair ---- based on nature of patient's pathology/ies and treatment compliance issues. Prognosis is greatly dependent upon patient's ability to remain sober and to follow up with scheduled appointments as well as to comply with psychiatric medications as prescribed.             DISCHARGE MEDICATIONS:     Informed consent given for the use of following psychotropic medications:  Current Discharge Medication List      START taking these medications    Details   amLODIPine (NORVASC) 2.5 mg tablet Take 1 Tab by mouth daily. Indications: high blood pressure  Qty: 30 Tab, Refills: 0      lithium carbonate 300 mg capsule Take 1 Cap by mouth two (2) times a day. Indications: bipolar disorder  Qty: 60 Cap, Refills: 0      cephALEXin (KEFLEX) 500 mg capsule Take 1 Cap by mouth four (4) times daily. Indications: urinary tract infection due to E. coli bacteria  Qty: 10 Cap, Refills: 0      loperamide (IMODIUM) 2 mg capsule Take 2 Caps by mouth every twelve (12) hours as needed for Diarrhea for up to 10 days. Indications: diarrhea  Qty: 10 Cap, Refills: 0      methocarbamoL (ROBAXIN) 750 mg tablet Take 1 Tab by mouth four (4) times daily as needed for Muscle Spasm(s). Indications: muscle spasm  Qty: 120 Tab, Refills: 0      promethazine (PHENERGAN) 25 mg tablet Take 1 Tab by mouth every eight (8) hours as needed for Nausea. Indications: nausea and vomiting  Qty: 30 Tab, Refills: 0      metFORMIN (GLUMETZA ER) 500 mg TG24 24 hour tablet Take 500 mg by mouth daily (after dinner). Indications: prevention of type 2 diabetes mellitus  Qty: 30 Tab, Refills: 0         CONTINUE these medications which have CHANGED    Details   QUEtiapine (SEROquel) 200 mg tablet Take 1 Tab by mouth nightly. Indications: insomnia, bipolar disorder  Qty: 30 Tab, Refills: 0         CONTINUE these medications which have NOT CHANGED    Details   lurasidone (Latuda) 80 mg tab tablet Take 80 mg by mouth daily (with dinner). Indications: bipolar disorder      buPROPion SR (Wellbutrin SR) 150 mg SR tablet Take 150 mg by mouth daily. Indications: mood disorder      ergocalciferol (ERGOCALCIFEROL) 1,250 mcg (50,000 unit) capsule Take 50,000 Units by mouth Every Saturday. Indications: vitamin D deficiency (high dose therapy)      ferrous sulfate (SLOW FE) 142 mg (45 mg iron) ER tablet Take 45 mg by mouth Daily (before breakfast).       albuterol (PROVENTIL VENTOLIN) 2.5 mg /3 mL (0.083 %) nebu 2.5 mg by Nebulization route every six (6) hours as needed for Wheezing or Shortness of Breath.      propranolol LA (INDERAL LA) 160 mg capsule Take 160 mg by mouth nightly. Indications: essential tremor      lisdexamfetamine (Vyvanse) 50 mg cap Take 50 mg by mouth daily. Indications: attention deficit disorder with hyperactivity      cyanocobalamin (VITAMIN B12) 1,000 mcg/mL injection 1,000 mcg by IntraMUSCular route Every Saturday. omeprazole (PRILOSEC) 40 mg capsule TAKE 1 CAPSULE BY MOUTH EVERY DAY      atorvastatin (LIPITOR) 10 mg tablet Take 10 mg by mouth daily. Indications: excessive fat in the blood      albuterol (PROVENTIL HFA, VENTOLIN HFA, PROAIR HFA) 90 mcg/actuation inhaler Take 2 Puffs by inhalation every four (4) hours as needed for Wheezing. Qty: 1 Inhaler, Refills: 0      topiramate (Topamax) 50 mg tablet Take 50 mg by mouth nightly. Indications: weight loss         STOP taking these medications       diazePAM (VALIUM) 10 mg tablet Comments:   Reason for Stopping:         hydroCHLOROthiazide (HYDRODIURIL) 25 mg tablet Comments:   Reason for Stopping:         primidone (MYSOLINE) 50 mg tablet Comments:   Reason for Stopping:                      A coordinated, multidisplinary treatment team round was conducted with Austin Cranker is done daily here at [facility]. This team consists of the nurse, psychiatric unit pharmacist,  and Gilmer Owen. I have spent greater than 35 minutes on discharge work.     Signed:  Adal Garcia MD  3/19/2021

## 2021-03-19 NOTE — SUICIDE SAFETY PLAN
SAFETY PLAN    A suicide Safety Plan is a document that supports someone when they are having thoughts of suicide. Warning Signs that indicate a suicidal crisis may be developing: What (situations, thoughts, feelings, body sensations, behaviors, etc.) do you experience that lets you know you are beginning to think about suicide? 1. Resentment and anger building  2. No sense of control  3. Racing thoughts and ruminations    Internal Coping Strategies:  What things can I do (relaxation techniques, hobbies, physical activities, etc.) to take my mind off my problems without contacting another person? 1. meditation  2. music  3. journaling    People and social settings that provide distraction: Who can I call or where can I go to distract me? 1. Name: walking dog, \"wendy\"  Phone:   2. Name: my , [place]  Phone: [phone]   3. Place: [place]            4. Place: river    People whom I can ask for help: Who can I call when I need help - for example, friends, family, clergy, someone else? 1. Name: Micha Antunez                Phone:   2. Name: Brettottoniel Gary  Phone: [phone]  3. Name: Jf Moralez  Phone:     Professionals or [facility] agencies I can contact during a crisis: Who can I call for help - for example, my doctor, my psychiatrist, my psychologist, a mental health provider, a suicide hotline? 1. Clinician Name: Dr. Xander Choudhury   Phone: [phone]        Clinician Pager or Emergency Contact #[phone]    2. Clinician Name: Sabrina Ariza   Phone: [phone]      Clinician Pager or Emergency Contact #: [phone]    3. Suicide Prevention Lifeline: 8-997-921-TALK (4474)    4.  [address] [place] [address] Emergency Services -  for example, [facility], [facility] suicide hotline, [facility] Hotline: [address] [facility]      Emergency Services Address: [place] [address]        Emergency Services Phone: [phone]    Making the environment safe: How can I make my environment (house/apartment/living space) safer? For example, can I remove guns, medications, and other items? 1. Take car keys away  2.  No weapons, no safety concerns

## 2021-03-19 NOTE — BH NOTES
Behavioral Health Treatment Team Note      Patient goal(s) for today: continue taking meds as prescribe, engage in unit activities, participate in hygiene/ADLs, prepare for discharge, follow directions from staff, contact support team  Treatment team focus/goals: continue adjusting meds as needed, discharge planning, update support system     Progress note: Patient presents with anxious attitude and mood, clear speech, perseverative thought stream (focused on providing reasons why she should be able to discharge), poor judgment, limited insight. Patient reports that she is not doing well and expressed frustration with difficulty sleeping and not feeling well. Patient requesting discharge, stating she wants to return so she can sleep well. Patient reports continued nausea and diarrhea. She reports she is puking up all her medications and they don't have a change to work. Plans to return home to take the medicine she has there. SW voiced concerns with the discharge plan, noting patient's health concerns since admission, recent significant medication changes, and patient's reports that her medications she has at home were not working and resulted in admission. Patient maintains that her biggest issue is she has some toxic friends she needs to avoid. Patient insisting on discharge. Denies SI/HI, AVH. Reports no safety concerns returning home and completed safety plan.  Will follow up with Dr. Jamal Travis at [facility] [address] [facility].      CHANDAN:  5                       [address] info/prescription coverage:  [employer] Medicare  Date of last family contact:  [facility] for Bruce Karimi () [phone]  Family requesting physician contact today:  no  Discharge plan:  Return to home  Guns in the home:  no   Outpatient provider(s):  Sheri FISCHER     Participating treatment team Alee Mariano MD

## 2021-03-19 NOTE — BH NOTES
GROUP THERAPY PROGRESS NOTE    Patient is participating in Discharge Planning Group. Group time: 30 minutes    Personal goal for participation: Process feelings related to discharge and/or feelings/goals for today. Goal orientation: Personal    Group therapy participation: active    Therapeutic interventions reviewed and discussed: Group discussion was focused on discharge plans and anxiety related to this. Group members discussed what they planned to do once discharge and discharge plans. Patients discussed their goals for today as well as the weekend and what they are working on with treatment team to get ready for discharge. Impression of participation: Pola Ortega was present in group. She shared that she is planning to go home today. She shared that her goal is to discharge and to make sure that she talks to her family and friends. She reports her best friend was not supportive before and she needs to make sure to reach out and talk to others.     [facility]end[facility]caraz LPC [facility]

## 2021-03-19 NOTE — PROGRESS NOTES
Spiritual Care Assessment/Progress Note  [address] [facility]      NAME: Cora Joseph      MRN: [id number]  AGE: 48 y.o.  SEX: female  Scientology Affiliation: Julianne Chacon   Language: English     3/19/2021     Total Time (in minutes): 47     Spiritual Assessment begun in [place] [address] [address] [facility] through conversation with:         [x]Patient        [] Family    [] Friend(s)        Reason for Consult: Request by staff     Spiritual beliefs: (Please include comment if needed)     [x] Identifies with a merrill tradition:         [x] Supported by a merrill community:            [] Claims no spiritual orientation:           [] Seeking spiritual identity:                [] Adheres to an individual form of spirituality:           [] Not able to assess:                           Identified resources for coping:      [x] Prayer                               [] Music                  [] Guided Imagery     [x] Family/friends                 [] Pet visits     [] Devotional reading                         [] Unknown     [] Other:                                           Interventions offered during this visit: (See comments for more details)    Patient Interventions: Affirmation of emotions/emotional suffering, Affirmation of merrill, Catharsis/review of pertinent events in supportive environment, Initial/Spiritual assessment, patient floor, Guidance concerning next steps/process to be expected, Life review/legacy, Normalization of emotional/spiritual concerns, Prayer (actual), Prayer (assurance of), Reframing, Scientology beliefs/image of God discussed           Plan of Care:     [] Support spiritual and/or cultural needs    [] Support AMD and/or advance care planning process      [] Support grieving process   [] Coordinate Rites and/or Rituals    [] Coordination with community clergy   [] No spiritual needs identified at this time   [] Detailed Plan of Care below (See Comments)  [] Make referral to Music Therapy  [] Make referral to Pet Therapy     [] Make referral to Addiction services  [] Make referral to [facility]  [] Make referral to Spiritual Care Partner  [] No future visits requested        [x] Follow up upon further referrals     Comments: Responded to ancillary consult for pt Jared on [address]. Pt is actively discharging and  is on the way to pick her up. Pt expressed strong support and connection with . 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 Affirmed the ora of her Anabaptism community and the stress of recent dynamics involving decline and distrust of some leaders who have led to the decline in membership. Affirmed the desire to seek sources of strength by checking out another Anabaptism community, seeking support from , and setting up boundaries in her caregiving role, establishing a trust that her intentions are enough and the objectification of those she is helping cannot take away her God-given and personal identity. Affirmed trust that she made a good decision to seek out mental health. Provided prayer at bedside upon request. Assured of prayers and advised of  services.     Rhianna 1 MARJORIE Martin 1 Provider   Paging Service [phone] ([phone])

## 2021-03-19 NOTE — BH NOTES
1930  Assumed care of patient from day shift nurse. Patient observed sitting in the day room socializing with peers. Patient reports feeling mildly anxious. Denies SI/HI/AVH. 2120  Patient is med compliant. PRN Robaxin administered. Patient c/o muscle spasms. Will monitor for efficacy. Patient c/o feeling nauseous. PRN Phenergan administered. 2220  Medication noted effective.        0000 Patient appears to be sleeping.      0600 slept for 6 hours

## 2021-03-19 NOTE — DISCHARGE INSTRUCTIONS
Syliva Gowers  : 1970  MRN: [id number]    The patient Jerry Brennan exhibits the ability to control behavior in a less restrictive environment. Patient's level of functioning is improving. No assaultive/destructive behavior has been observed for the past 24 hours. No suicidal/homicidal threat or behavior has been observed for the past 24 hours. There is no evidence of serious medication side effects. Patient has not been in physical or protective restraints for at least the past 24 hours. If weapons involved, how are they secured? No weapons    Is patient aware of and in agreement with discharge plan? Patient wishes to return home and follow up with outpatient providers    Arrangements for medication:  Prescriptions sent to preferred pharmacy    Copy of discharge instructions to provider?:  Fax to [facility] for transportation home:   to transport    Keep all follow up appointments as scheduled, continue to take prescribed medications per physician instructions. Mental health crisis number:  412 or your local mental health crisis line number at [phone]        [place] [address] Emergency WARM LINE      [phone] ([phone])      M-F: 9am to 9pm      Sat & Sun: 5pm - 9pm  National suicide prevention lines:                             9-186-IYTPMAR (8-769-827-127.314.5409)       7-608-567-TALK (6-922-458-851-722-2678)    Crisis Text Line:  Text HOME to 308836  If I feel I am at risk of hurting myself or others, I will call the crisis office and speak with a crisis worker who will assist me during my crisis. Jigar Oliver .If I feel I am at risk of hurting myself or others, I will call the crisis office and speak with a crisis worker who will assist me during my crisis.     Patient Education        Bipolar Disorder: Care Instructions  Your Care Instructions     Bipolar disorder is an illness that causes extreme mood changes, from times of very high energy (manic episodes) to times of depression. But many people with bipolar disorder show only the symptoms of depression. These moods may cause problems with your work, school, family life, friendships, and how well you function. This disease is also called manic-depression. There is no cure for bipolar disorder, but it can be helped with medicines. Counseling may also help. It is important to take your medicines exactly as prescribed, even when you feel well. You may need lifelong treatment. Follow-up care is a key part of your treatment and safety. Be sure to make and go to all appointments, and call your doctor if you are having problems. It's also a good idea to know your test results and keep a list of the medicines you take. How can you care for yourself at home? · Be safe with medicines. Take your medicines exactly as prescribed. Do not stop or change a medicine without talking to your doctor first. Hope Lei and your doctor may need to try different combinations of medicines to find what works for you. · Take your medicines on schedule to keep your moods even. When you feel good, you may think that you do not need your medicines. But it is important to keep taking them. · Go to your counseling sessions. Call and talk with your counselor if you can't go to a session or if you don't think the sessions are helping. Do not just stop going. · Get at least 30 minutes of activity on most days of the week. Walking is a good choice. You also may want to do other things, such as running, swimming, or cycling. · Get enough sleep. Keep your room dark and quiet. Try to go to bed at the same time every night. · Eat a healthy diet. This includes whole grains, dairy, fruits, vegetables, and protein. Eat foods from each of these groups. · Try to lower your stress. Manage your time, build a strong support system, and lead a healthy lifestyle.  To lower your stress, try physical activity, slow deep breathing, or getting a massage. · Do not use alcohol, marijuana, or illegal drugs. · Learn the early signs of your mood changes. You can then take steps to help yourself feel better. · Ask for help from friends and family when you need it. You may need help with daily chores when you are depressed. When you are manic, you may need support to control your high energy levels. What should you do if someone in your family has bipolar disorder? · Learn about the disease and signs it's getting worse. · Remind your family member you love them. · Make a plan with all family members about how to take care of your loved one when symptoms are bad. · Remind yourself it will take time for changes to occur. · Try not to blame yourself for the disease. · Know your legal rights and the legal rights of your family member. Support groups or counselors can help with this information. · Take care of yourself. Keep up with your interests, such as career, hobbies, and friends. Use exercise, positive self-talk, deep breathing, and other relaxing exercises to help lower your stress. · Give yourself time to grieve. You may need to deal with emotions such as anger, fear, and frustration. · If you are having a hard time with your feelings or with your relationship with your family member, talk with a counselor. When should you call for help? Call 911 anytime you think you may need emergency care. For example, call if:    · You feel like hurting yourself or someone else.     · Someone who has bipolar disorder displays dangerous behavior, and you think the person might hurt himself or herself or someone else. Call your doctor now or seek immediate medical care if:    · You hear voices.     · Someone you know has bipolar disorder and talks about suicide. Keep the numbers for these national suicide hotlines: 2-549-440-TALK (9-700.287.8428) and 0-734-IKDBNON (2-982.857.2314).  If a suicide threat seems real, with a specific plan and a way to carry it out, stay with the person, or ask someone you trust to stay with the person, until you can get help.     · Someone you know has bipolar disorder and:  ? Starts to give away possessions. ? Is using illegal drugs or drinking alcohol heavily. ? Talks or writes about death, including writing suicide notes or talking about guns, knives, or pills. ? Talks or writes about hurting someone else. ? Starts to spend a lot of time alone. ? Acts very aggressively or suddenly appears calm. ? Talks about beliefs that are not based in reality (delusions). Watch closely for changes in your health, and be sure to contact your doctor if:    · You cannot go to your counseling sessions. Where can you learn more? Go to http://www.benjamin.com/  Enter K052 in the search box to learn more about \"Bipolar Disorder: Care Instructions. \"  Current as of: January 31, 2020               Content Version: 12.6  © 7353-7112 [employer]. Care instructions adapted under license by [employer] (which disclaims liability or warranty for this information). If you have questions about a medical condition or this instruction, always ask your healthcare professional. [place] [address] any warranty or liability for your use of this information.

## 2021-03-19 NOTE — GROUP NOTE
[facility]  GROUP DOCUMENTATION INDIVIDUAL Group Therapy Note Date: 3/19/2021 Group Start Time: 1000 Group End Time: 1100 Group Topic: Topic Group [facility] 3 ACUTE BEHAV [facility] [place], [address] [facility] GROUP DOCUMENTATION GROUP Group Therapy Note Attendees: 6 Attendance: Did not attend Patient's Goal: Interventions/techniques:Aubree Morse

## 2021-03-19 NOTE — BH NOTES
0700-Report received from   [address], RN  0800-1000-Patient in  No acute distress at this time she met with the team and will be discharge today   Time she denies SI/HI and  AH at this time she is calm and cooperative, pleasant  With peers and staff  She complain of nauseas at this time she was given medication.

## 2022-03-19 PROBLEM — F31.9 BIPOLAR DISORDER (HCC): Status: ACTIVE | Noted: 2021-03-16

## 2022-03-19 PROBLEM — R25.1 TREMORS OF NERVOUS SYSTEM: Status: ACTIVE | Noted: 2020-12-23

## 2022-03-19 PROBLEM — R11.2 NAUSEA AND VOMITING: Status: ACTIVE | Noted: 2020-12-23

## 2022-03-20 PROBLEM — T56.891A LITHIUM TOXICITY: Status: ACTIVE | Noted: 2020-12-21

## 2022-10-13 NOTE — BH NOTES
Behavioral Health Transition Record to Provider    Patient Name: Jaswinder Omalley  YOB: 1970  Medical Record Number: [id number]  Date of Admission: 3/16/2021  Date of Discharge: 3/19/21    Attending Provider: No att. providers found  Discharging Provider: Dr. Olegario Diamond MD  To contact this individual call [phone] and ask the  to page. If unavailable, ask to be transferred to [address] [place] [address] Provider on call. [facility] Provider will be available on call 24/7 and during holidays. Primary Care Provider: Arnoldo Way MD    No Known Allergies    Reason for Admission: Per chart review, patient came to ED where she reported SI with plan to overdose or drive into a tree and reporting that she has not sleep in over a week, increased irritability, anger outbursts, racing thoughts. Patient reports she has had recent medications changes due to lithium toxicity in December and has decompensated since. Admission Diagnosis: Bipolar disorder ([facility] [F31.9]    * No surgery found *    Results for orders placed or performed during the hospital encounter of 03/16/21   SARS-COV-2   Result Value Ref Range    SARS-CoV-2 Please find results under separate order     SARS-COV-2   Result Value Ref Range    Specimen source Nasopharyngeal      SARS-CoV-2 Not detected NOTD     METABOLIC PANEL, BASIC   Result Value Ref Range    Sodium 145 136 - 145 mmol/L    Potassium 3.6 3.5 - 5.1 mmol/L    Chloride 107 97 - 108 mmol/L    CO2 30 21 - 32 mmol/L    Anion gap 8 5 - 15 mmol/L    Glucose 123 (H) 65 - 100 mg/dL    BUN 10 6 - 20 MG/DL    Creatinine 0.78 0.55 - 1.02 MG/DL    BUN/Creatinine ratio 13 12 - 20      GFR est AA >60 >60 ml/min/1.73m2    GFR est non-AA >60 >60 ml/min/1.73m2    Calcium 9.4 8.5 - 10.1 MG/DL       Immunizations administered during this encounter: There is no immunization history on file for this patient.     Screening for Metabolic Disorders for Patients on Hospitalist Progress Note  10/12/2022 8:27 PM  Subjective:   Admit Date: 10/3/2022  PCP: Natalie Miles MD     DNR-CCA      C/c:  Chief Complaint   Patient presents with    Altered Mental Status         Interval History: pt sleeping quietly, bun/cr increasing,very poor intake, no agitation    Diet: ADULT DIET; Regular  ADULT ORAL NUTRITION SUPPLEMENT; Breakfast, Lunch, Dinner; Standard High Calorie/High Protein Oral Supplement                                ip days:9  Medications:   Scheduled Meds:   carvedilol  6.25 mg Oral BID WC    allopurinol  300 mg Oral Daily    aspirin  81 mg Oral Daily    melatonin  9 mg Oral QPM    [Held by provider] spironolactone  25 mg Oral Daily    tamsulosin  0.4 mg Oral Daily    QUEtiapine  100 mg Oral Nightly    galantamine  8 mg Oral BID WC    sodium chloride flush  5-40 mL IntraVENous 2 times per day    [Held by provider] enoxaparin  30 mg SubCUTAneous Daily     Continuous Infusions:   dextrose 5 % and 0.45 % NaCl 75 mL/hr at 10/12/22 1144    sodium chloride Stopped (10/08/22 0132)     PRN Meds:. LORazepam, haloperidol lactate, sodium chloride flush, sodium chloride, acetaminophen, ondansetron **OR** ondansetron     CBC:   Recent Labs     10/10/22  0602 10/11/22  0550 10/12/22  0622   WBC 12.7* 12.5* 13.3*   HGB 13.4* 13.1* 13.2*    231 217     BMP:    Recent Labs     10/10/22  0602 10/11/22  0550 10/12/22  0622    142 143   K 4.8 4.8 4.9   * 108* 109*   CO2 19* 19* 17*   BUN 76* 83* 87*   CREATININE 3.25* 3.55* 3.42*   GLUCOSE 116* 108* 103*     Hepatic: No results for input(s): AST, ALT, ALB, BILITOT, ALKPHOS in the last 72 hours. Troponin: No results for input(s): TROPONINI in the last 72 hours. BNP: No results for input(s): BNP in the last 72 hours. Lipids: No results for input(s): CHOL, HDL in the last 72 hours. Invalid input(s): LDLCALCU  INR: No results for input(s): INR in the last 72 hours.     Objective:   Vitals: BP (!) 158/98   Pulse 67 Antipsychotic Medications  (Data obtained from the EMR)    Estimated Body Mass Index  Estimated body mass index is 42.34 kg/m² as calculated from the following:    Height as of 12/21/20: 5' 6\" (1.676 m). Weight as of 3/15/21: 119 kg (262 lb 5.6 oz). Vital Signs/Blood Pressure  Visit Vitals  /75 (BP 1 Location: Left upper arm, BP Patient Position: Lying)   Pulse 63   Temp 98.3 °F (36.8 °C)   Resp 16   SpO2 96%       Blood Glucose/Hemoglobin A1c  Lab Results   Component Value Date/Time    Glucose 123 (H) 03/18/2021 05:12 AM    Glucose (POC) 116 (H) 03/16/2021 01:15 AM    Glucose (POC) 87 06/05/2012 06:47 PM       No results found for: HBA1C, HGBE8, PUB7BHQW     Lipid Panel  No results found for: CHOL, CHOLX, CHLST, CHOLV, 618171, HDL, HDLP, LDL, LDLC, DLDLP, TGLX, TRIGL, TRIGP, CHHD, CHHDX     Discharge Diagnosis: please refer to physician's discharge summary    Discharge Plan: The patient Angelica Barrios exhibits the ability to control behavior in a less restrictive environment. Patient's level of functioning is improving. No assaultive/destructive behavior has been observed for the past 24 hours. No suicidal/homicidal threat or behavior has been observed for the past 24 hours. There is no evidence of serious medication side effects. Patient has not been in physical or protective restraints for at least the past 24 hours. If weapons involved, how are they secured? No weapons    Is patient aware of and in agreement with discharge plan? Patient wishes to return home and follow up with outpatient providers    Arrangements for medication:  Prescriptions sent to preferred pharmacy    Copy of discharge instructions to provider?:  [facility]   to transport    Keep all follow up appointments as scheduled, continue to take prescribed medications per physician instructions.   Mental health crisis number:  [id number] or your local mental health crisis line Temp 97.9 °F (36.6 °C)   Resp 17   Ht 5' 11\" (1.803 m)   Wt 206 lb (93.4 kg)   SpO2 96%   BMI 28.73 kg/m²   General appearance: alert, appears stated age and cooperative  Skin: Skin color, texture, turgor normal. No rashes or lesions  Lungs: clear to auscultation bilaterally  Heart: regular rate and rhythm, S1, S2 normal, no murmur, click, rub or gallop  Abdomen: soft, non-tender; bowel sounds normal; no masses,  no organomegaly  Extremities: extremities normal, atraumatic, no cyanosis or edema  Neurologic: Mental status: Alert, oriented, thought content appropriate    Prophylaxis:   DVT with  [] lovenox        [] heparin        [] Scd        [x] none:     Radiology:  CT HEAD WO CONTRAST    Result Date: 10/3/2022  EXAMINATION: CT OF THE HEAD WITHOUT CONTRAST  10/3/2022 11:27 am TECHNIQUE: CT of the head was performed without the administration of intravenous contrast. Automated exposure control, iterative reconstruction, and/or weight based adjustment of the mA/kV was utilized to reduce the radiation dose to as low as reasonably achievable. COMPARISON: 01/21/2021 HISTORY: ORDERING SYSTEM PROVIDED HISTORY: Mental Status Changes TECHNOLOGIST PROVIDED HISTORY: Mental Status Changes Decision Support Exception - unselect if not a suspected or confirmed emergency medical condition->Emergency Medical Condition (MA) Reason for Exam: AMS. FINDINGS: BRAIN/VENTRICLES: There is no acute intracranial hemorrhage, mass effect or midline shift. No abnormal extra-axial fluid collection. The gray-white differentiation is maintained without evidence of an acute infarct. There is no evidence of hydrocephalus. ORBITS: The visualized portion of the orbits demonstrate no acute abnormality. SINUSES: There is mild mucosal thickening in the right frontal, ethmoid, and bilateral sphenoid sinuses. Mild mucosal thickening in the right maxillary sinus. There are bubbly secretions in the posterior right ethmoid sinuses.  No air-fluid level.  Mastoid air cells are aerated. SOFT TISSUES/SKULL:  No acute abnormality of the visualized skull or soft tissues. No acute intracranial abnormality. VL Carotid Bilateral    Result Date: 10/5/2022    [facility]  Vascular Carotid Procedure   Patient Name Isa Ghotra Date of Study           10/04/2022               O   Date of      1944  Gender                  Male  Birth   Age          66 year(s)  Race                       Room Number  2069   Corporate ID [id number]  #   Patient Pati [de-identified]  #   MR #         [id number]      [facility]   Accession #  [id number]  Interpreting Physician  Cesario Ramirez   Referring                Referring Physician     Chance Mendez  Nurse  Practitioner  Procedure Type of Study:   Cerebral: Carotid, Carotid Scan Bilateral.  Indications for Study:Neck distention and Carotid stenosis. Patient Status: In Patient. Technical Quality:Adequate visualization. Conclusions   Summary   Bilateral:  There is plaque at the level of the carotid bifurcation with a velocity  profile consistent with no significant stenosis . The vertebral arteries  are patent with antegrade flow. Signature   ----------------------------------------------------------------  Electronically signed by Raffy Vargas(Sonographer) on  10/04/2022 11:36 AM  ----------------------------------------------------------------   ----------------------------------------------------------------  Electronically signed by Cesario Ramirez(Interpreting physician)  on 10/05/2022 06:29 AM  ----------------------------------------------------------------  Findings:   Right Impression:                    Left Impression:  The common carotid artery is normal. The common carotid artery is normal.   The carotid bulb is normal.          The carotid bulb is normal.   The external carotid artery has a    The external carotid artery has a  smooth calcific plaque. number at [phone]      Discharge Medication List and Instructions:   Discharge Medication List as of 3/19/2021 10:41 AM      START taking these medications    Details   amLODIPine (NORVASC) 2.5 mg tablet Take 1 Tab by mouth daily. Indications: high blood pressure, Normal, Disp-30 Tab, R-0      lithium carbonate 300 mg capsule Take 1 Cap by mouth two (2) times a day. Indications: bipolar disorder, Normal, Disp-60 Cap, R-0      cephALEXin (KEFLEX) 500 mg capsule Take 1 Cap by mouth four (4) times daily. Indications: urinary tract infection due to E. coli bacteria, Normal, Disp-10 Cap, R-0      loperamide (IMODIUM) 2 mg capsule Take 2 Caps by mouth every twelve (12) hours as needed for Diarrhea for up to 10 days. Indications: diarrhea, Normal, Disp-10 Cap, R-0      methocarbamoL (ROBAXIN) 750 mg tablet Take 1 Tab by mouth four (4) times daily as needed for Muscle Spasm(s). Indications: muscle spasm, Normal, Disp-120 Tab, R-0      promethazine (PHENERGAN) 25 mg tablet Take 1 Tab by mouth every eight (8) hours as needed for Nausea. Indications: nausea and vomiting, Normal, Disp-30 Tab, R-0      metFORMIN (GLUMETZA ER) 500 mg TG24 24 hour tablet Take 500 mg by mouth daily (after dinner). Indications: prevention of type 2 diabetes mellitus, Normal, Disp-30 Tab, R-0         CONTINUE these medications which have CHANGED    Details   QUEtiapine (SEROquel) 200 mg tablet Take 1 Tab by mouth nightly. Indications: insomnia, bipolar disorder, Normal, Disp-30 Tab, R-0         CONTINUE these medications which have NOT CHANGED    Details   lurasidone (Latuda) 80 mg tab tablet Take 80 mg by mouth daily (with dinner). Indications: bipolar disorder, Historical Med      buPROPion SR (Wellbutrin SR) 150 mg SR tablet Take 150 mg by mouth daily. Indications: mood disorder, Historical Med      ergocalciferol (ERGOCALCIFEROL) 1,250 mcg (50,000 unit) capsule Take 50,000 Units by mouth Every Saturday.  Indications: vitamin D deficiency (high dose therapy), Historical Med      ferrous sulfate (SLOW FE) 142 mg (45 mg iron) ER tablet Take 45 mg by mouth Daily (before breakfast). , Historical Med      albuterol (PROVENTIL VENTOLIN) 2.5 mg /3 mL (0.083 %) nebu 2.5 mg by Nebulization route every six (6) hours as needed for Wheezing or Shortness of Breath., Historical Med      propranolol LA (INDERAL LA) 160 mg capsule Take 160 mg by mouth nightly. Indications: essential tremor, Historical Med      lisdexamfetamine (Vyvanse) 50 mg cap Take 50 mg by mouth daily. Indications: attention deficit disorder with hyperactivity, Historical Med      cyanocobalamin (VITAMIN B12) 1,000 mcg/mL injection 1,000 mcg by IntraMUSCular route Every Saturday., Historical Med      omeprazole (PRILOSEC) 40 mg capsule TAKE 1 CAPSULE BY MOUTH EVERY DAY, Historical Med      atorvastatin (LIPITOR) 10 mg tablet Take 10 mg by mouth daily. Indications: excessive fat in the blood, Historical Med      albuterol (PROVENTIL HFA, VENTOLIN HFA, PROAIR HFA) 90 mcg/actuation inhaler Take 2 Puffs by inhalation every four (4) hours as needed for Wheezing., Normal, Disp-1 Inhaler, R-0      topiramate (Topamax) 50 mg tablet Take 50 mg by mouth nightly. Indications: weight loss, Historical Med         STOP taking these medications       diazePAM (VALIUM) 10 mg tablet Comments:   Reason for Stopping:         hydroCHLOROthiazide (HYDRODIURIL) 25 mg tablet Comments:   Reason for Stopping:         primidone (MYSOLINE) 50 mg tablet Comments:   Reason for Stopping:               Unresulted Labs (24h ago, onward)     Start     Ordered    03/22/21 0600  LITHIUM  ONE TIME,   R     Question:  Dose amount:   Answer:  600 mg/day    03/19/21 0817 03/22/21 0600  LIPID PANEL  ONE TIME,   R      03/19/21 0818    03/22/21 0600  HEMOGLOBIN A1C WITH EAG  ONE TIME,   R      03/19/21 0818              To obtain results of studies pending at discharge, please contact N/A    Follow-up Information     Follow up With smooth calcific plaque. The internal carotid artery has a    The internal carotid artery has a  smooth calcific plaque causing a     smooth calcific plaque causing a <50%  <50% stenosis based on velocities. stenosis based on velocities. The vertebral artery is patent with  The vertebral artery is patent with  antegrade flow. antegrade flow. Velocities are measured in cm/s ; Diameters are measured in cm Carotid Right Measurements +------------+-------+------+--------+-------+------------+----------------+ ! Location    ! PSV    ! EDV   ! Angle   ! RI     !%Stenosis   ! Tortuosity      ! +------------+-------+------+--------+-------+------------+----------------+ ! Prox CCA    !59.6   ! 10    !        !0.83   !            !                ! +------------+-------+------+--------+-------+------------+----------------+ ! Mid CCA     !52.2   ! 10    !        !0.81   !            !                ! +------------+-------+------+--------+-------+------------+----------------+ ! Dist CCA    !52.2   !11    !        !0.79   !            !                ! +------------+-------+------+--------+-------+------------+----------------+ ! Bulb        !44.7   !11    !        !0.75   !            !                ! +------------+-------+------+--------+-------+------------+----------------+ ! Prox ICA    !37.3   !8     !        !0.79   !            !                ! +------------+-------+------+--------+-------+------------+----------------+ ! Mid ICA     !71     !18    !        !0.74   !            !                ! +------------+-------+------+--------+-------+------------+----------------+ ! Dist ICA    !74.6   !20    !        !0.73   !            !                ! +------------+-------+------+--------+-------+------------+----------------+ ! Prox ECA    !52.8   !5     !        !0.91   !            !                ! +------------+-------+------+--------+-------+------------+----------------+ ! Vertebral   !31.1   !0 !        !1      !            !                ! +------------+-------+------+--------+-------+------------+----------------+   - Additional Measurements:ICAPSV/CCAPSV 1.25. ICAEDV/CCAEDV 2. Carotid Left Measurements +------------+-------+------+--------+-------+------------+----------------+ ! Location    ! PSV    ! EDV   ! Angle   ! RI     !%Stenosis   ! Tortuosity      ! +------------+-------+------+--------+-------+------------+----------------+ ! Prox CCA    !68.3   !13    !        !0.81   !            !                ! +------------+-------+------+--------+-------+------------+----------------+ ! Mid CCA     !82.6   !14    !        !0.83   !            !                ! +------------+-------+------+--------+-------+------------+----------------+ ! Dist CCA    !84.5   !17    !        !0.8    ! !                ! +------------+-------+------+--------+-------+------------+----------------+ ! Bulb        !47.8   ! 10    !        !0.79   !            !                ! +------------+-------+------+--------+-------+------------+----------------+ ! Prox ICA    !75.2   ! 19    !        !0.75   !            !                ! +------------+-------+------+--------+-------+------------+----------------+ ! Mid ICA     !104    !32    !        !0.69   !            !                ! +------------+-------+------+--------+-------+------------+----------------+ ! Dist ICA    ! 97.5   !26    !        !0.73   !            !                ! +------------+-------+------+--------+-------+------------+----------------+ ! Prox ECA    !72.7   !12    !        !0.83   !            !                ! +------------+-------+------+--------+-------+------------+----------------+ ! Vertebral   !34.2   !6     !        !0.82   !            !                ! +------------+-------+------+--------+-------+------------+----------------+   - Additional Measurements:ICAPSV/CCAPSV 1.52. ICAEDV/CCAEDV 2.46.       Assessment :   Poor oral intake/dnrcc a/iv Specialties Details Why Contact Info    Meron Anne MD Family Medicine Call today Please follow up regarding medical concerns [place]  [phone]      [facility]  Call today Please call Alie Asher at [phone] to confirm your services with Dr. Chiquita Mesesr. [address]   [phone]          Advanced Directive:   Does the patient have an appointed surrogate decision maker? No  Does the patient have a Medical Advance Directive? No  Does the patient have a Psychiatric Advance Directive? No  If the patient does not have a surrogate or Medical Advance Directive AND Psychiatric Advance Directive, the patient was offered information on these advance directives Patient declined to complete    Patient Instructions: Please continue all medications until otherwise directed by physician. Tobacco Cessation Discharge Plan:   Is the patient a smoker and needs referral for smoking cessation? Yes  Patient referred to the following for smoking cessation with an appointment? Yes     Patient was offered medication to assist with smoking cessation at discharge? Yes  Was education for smoking cessation added to the discharge instructions? Yes    Alcohol/Substance Abuse Discharge Plan:   Does the patient have a history of substance/alcohol abuse and requires a referral for treatment? Not applicable  Patient referred to the following for substance/alcohol abuse treatment with an appointment? Not applicable  Patient was offered medication to assist with alcohol cessation at discharge? Not applicable  Was education for substance/alcohol abuse added to discharge instructions? Not applicable    Patient discharged to Home; discussed with patient/caregiver and provided to the patient/caregiver either in hard copy or electronically. fluids started/nephro to see  dementia     Plan:   1. Await placement  2. See order    Patient Active Problem List:     History of left inguinal hernia     Kidney disease, chronic, stage IV (severe, EGFR 15-29 ml/min) (HCC)     Isolated proteinuria     NSAID long-term use     Essential hypertension     Primary osteoarthritis of hand     Mitral valve prolapse     Interstitial nephritis chronic     MPGN (membranoproliferative glomerulonephritis), type 1     Nephrotic syndrome     Weakness     MILAGRO (acute kidney injury) ([facility]     Urinary incontinence     Gait disturbance     BPH (benign prostatic hyperplasia)     Vascular dementia without behavioral disturbance ([facility]     AMS (altered mental status)     Acute encephalopathy     Memory loss     Cognitive impairment      Anticipated Disposition upon discharge: [] Home                                                                         [] Home with Home Health                                                                         [] [facility]                                                                         [] [address] ,[address]      Patient is admitted as inpatient status because of co-morbidities listed above, severity of signs and symptoms as outlined, requirement for current medical therapies and most importantly because of direct risk to patient if care not provided in a hospital setting.           Oziel Allen MD, MD  Rounding Hospitalist

## 2023-05-11 RX ORDER — ALBUTEROL SULFATE 2.5 MG/3ML
SOLUTION RESPIRATORY (INHALATION) EVERY 6 HOURS PRN
COMMUNITY

## 2023-05-11 RX ORDER — AMLODIPINE BESYLATE 2.5 MG/1
TABLET ORAL DAILY
COMMUNITY
Start: 2021-03-19

## 2023-05-11 RX ORDER — OMEPRAZOLE 40 MG/1
1 CAPSULE, DELAYED RELEASE ORAL DAILY
COMMUNITY
Start: 2020-10-23

## 2023-05-11 RX ORDER — BUPROPION HYDROCHLORIDE 150 MG/1
TABLET, EXTENDED RELEASE ORAL DAILY
COMMUNITY

## 2023-05-11 RX ORDER — CEPHALEXIN 500 MG/1
CAPSULE ORAL 4 TIMES DAILY
COMMUNITY
Start: 2021-03-19

## 2023-05-11 RX ORDER — METHOCARBAMOL 750 MG/1
TABLET, FILM COATED ORAL 4 TIMES DAILY PRN
COMMUNITY
Start: 2021-03-19

## 2023-05-11 RX ORDER — LITHIUM CARBONATE 300 MG/1
CAPSULE ORAL 2 TIMES DAILY
COMMUNITY
Start: 2021-03-19

## 2023-05-11 RX ORDER — LURASIDONE HYDROCHLORIDE 80 MG/1
TABLET, FILM COATED ORAL
COMMUNITY

## 2023-05-11 RX ORDER — TOPIRAMATE 50 MG/1
TABLET, FILM COATED ORAL
COMMUNITY

## 2023-05-11 RX ORDER — PROPRANOLOL HYDROCHLORIDE 160 MG/1
CAPSULE, EXTENDED RELEASE ORAL
COMMUNITY

## 2023-05-11 RX ORDER — ERGOCALCIFEROL 1.25 MG/1
CAPSULE ORAL
COMMUNITY

## 2023-05-11 RX ORDER — QUETIAPINE FUMARATE 200 MG/1
TABLET, FILM COATED ORAL
COMMUNITY
Start: 2021-03-19

## 2023-05-11 RX ORDER — METFORMIN HYDROCHLORIDE 500 MG/1
TABLET, FILM COATED, EXTENDED RELEASE ORAL
COMMUNITY
Start: 2021-03-19

## 2023-05-11 RX ORDER — ALBUTEROL SULFATE 90 UG/1
2 AEROSOL, METERED RESPIRATORY (INHALATION) EVERY 4 HOURS PRN
COMMUNITY
Start: 2020-07-20

## 2023-05-11 RX ORDER — CYANOCOBALAMIN 1000 UG/ML
INJECTION, SOLUTION INTRAMUSCULAR; SUBCUTANEOUS
COMMUNITY
Start: 2020-12-11

## 2023-05-11 RX ORDER — ATORVASTATIN CALCIUM 10 MG/1
TABLET, FILM COATED ORAL DAILY
COMMUNITY
Start: 2020-07-21

## 2023-05-11 RX ORDER — PROMETHAZINE HYDROCHLORIDE 25 MG/1
TABLET ORAL EVERY 8 HOURS PRN
COMMUNITY
Start: 2021-03-19

## 2025-07-25 ENCOUNTER — HOSPITAL ENCOUNTER (OUTPATIENT)
Facility: HOSPITAL | Age: 55
Discharge: HOME | End: 2025-07-28

## 2025-07-26 LAB
25(OH)D3 SERPL-MCNC: 45 NG/ML (ref 30–100)
ANION GAP SERPL CALC-SCNC: 5 MMOL/L (ref 2–12)
BASOPHILS # BLD: 0.05 K/UL (ref 0–0.1)
BASOPHILS NFR BLD: 0.6 % (ref 0–1)
BUN SERPL-MCNC: 8 MG/DL (ref 6–20)
BUN/CREAT SERPL: 8 (ref 12–20)
CALCIUM SERPL-MCNC: 9.4 MG/DL (ref 8.5–10.1)
CHLORIDE SERPL-SCNC: 109 MMOL/L (ref 97–108)
CHOLEST SERPL-MCNC: 141 MG/DL
CO2 SERPL-SCNC: 26 MMOL/L (ref 21–32)
CREAT SERPL-MCNC: 0.99 MG/DL (ref 0.55–1.02)
DATE LAST DOSE: NORMAL
DIFFERENTIAL METHOD BLD: NORMAL
DOSE AMOUNT: NORMAL UNITS
DOSE DATE/TIME: NORMAL
EOSINOPHIL # BLD: 0.35 K/UL (ref 0–0.4)
EOSINOPHIL NFR BLD: 4.3 % (ref 0–7)
ERYTHROCYTE [DISTWIDTH] IN BLOOD BY AUTOMATED COUNT: 12.9 % (ref 11.5–14.5)
EST. AVERAGE GLUCOSE BLD GHB EST-MCNC: 108 MG/DL
FOLATE SERPL-MCNC: 47.9 NG/ML (ref 5–21)
GLUCOSE SERPL-MCNC: 107 MG/DL (ref 65–100)
HBA1C MFR BLD: 5.4 % (ref 4–5.6)
HCT VFR BLD AUTO: 45.7 % (ref 35–47)
HDLC SERPL-MCNC: 42 MG/DL
HDLC SERPL: 3.4 (ref 0–5)
HGB BLD-MCNC: 14.5 G/DL (ref 11.5–16)
IMM GRANULOCYTES # BLD AUTO: 0.04 K/UL (ref 0–0.04)
IMM GRANULOCYTES NFR BLD AUTO: 0.5 % (ref 0–0.5)
LDLC SERPL CALC-MCNC: 56.4 MG/DL (ref 0–100)
LITHIUM SERPL-SCNC: 1.06 MMOL/L (ref 0.6–1.2)
LYMPHOCYTES # BLD: 1.92 K/UL (ref 0.8–3.5)
LYMPHOCYTES NFR BLD: 23.3 % (ref 12–49)
MCH RBC QN AUTO: 30.8 PG (ref 26–34)
MCHC RBC AUTO-ENTMCNC: 31.7 G/DL (ref 30–36.5)
MCV RBC AUTO: 97 FL (ref 80–99)
MONOCYTES # BLD: 0.48 K/UL (ref 0–1)
MONOCYTES NFR BLD: 5.8 % (ref 5–13)
NEUTS SEG # BLD: 5.39 K/UL (ref 1.8–8)
NEUTS SEG NFR BLD: 65.5 % (ref 32–75)
NRBC # BLD: 0 K/UL (ref 0–0.01)
NRBC BLD-RTO: 0 PER 100 WBC
PLATELET # BLD AUTO: 219 K/UL (ref 150–400)
PMV BLD AUTO: 10.4 FL (ref 8.9–12.9)
POTASSIUM SERPL-SCNC: 4.1 MMOL/L (ref 3.5–5.1)
RBC # BLD AUTO: 4.71 M/UL (ref 3.8–5.2)
SODIUM SERPL-SCNC: 140 MMOL/L (ref 136–145)
TRIGL SERPL-MCNC: 213 MG/DL
TSH SERPL DL<=0.05 MIU/L-ACNC: 2.9 UIU/ML (ref 0.36–3.74)
VIT B12 SERPL-MCNC: 527 PG/ML (ref 193–986)
VLDLC SERPL CALC-MCNC: 42.6 MG/DL
WBC # BLD AUTO: 8.2 K/UL (ref 3.6–11)